# Patient Record
Sex: FEMALE | Race: WHITE | NOT HISPANIC OR LATINO | Employment: OTHER | URBAN - METROPOLITAN AREA
[De-identification: names, ages, dates, MRNs, and addresses within clinical notes are randomized per-mention and may not be internally consistent; named-entity substitution may affect disease eponyms.]

---

## 2019-06-27 ENCOUNTER — TELEPHONE (OUTPATIENT)
Dept: PULMONOLOGY | Facility: CLINIC | Age: 74
End: 2019-06-27

## 2019-06-27 DIAGNOSIS — R06.02 SOB (SHORTNESS OF BREATH): Primary | ICD-10-CM

## 2019-07-01 ENCOUNTER — CLINICAL SUPPORT (OUTPATIENT)
Dept: PULMONOLOGY | Facility: CLINIC | Age: 74
End: 2019-07-01
Payer: MEDICARE

## 2019-07-01 ENCOUNTER — HOSPITAL ENCOUNTER (OUTPATIENT)
Dept: RADIOLOGY | Facility: HOSPITAL | Age: 74
Discharge: HOME | End: 2019-07-01
Attending: INTERNAL MEDICINE
Payer: MEDICARE

## 2019-07-01 ENCOUNTER — OFFICE VISIT (OUTPATIENT)
Dept: PULMONOLOGY | Facility: CLINIC | Age: 74
End: 2019-07-01
Payer: MEDICARE

## 2019-07-01 VITALS
OXYGEN SATURATION: 95 % | RESPIRATION RATE: 18 BRPM | SYSTOLIC BLOOD PRESSURE: 140 MMHG | BODY MASS INDEX: 47.8 KG/M2 | HEART RATE: 61 BPM | DIASTOLIC BLOOD PRESSURE: 76 MMHG | HEIGHT: 63 IN | WEIGHT: 269.81 LBS

## 2019-07-01 DIAGNOSIS — R79.89 POSITIVE D DIMER: ICD-10-CM

## 2019-07-01 DIAGNOSIS — R06.00 DYSPNEA AND RESPIRATORY ABNORMALITIES: Primary | ICD-10-CM

## 2019-07-01 DIAGNOSIS — R06.02 SOB (SHORTNESS OF BREATH): ICD-10-CM

## 2019-07-01 DIAGNOSIS — Z12.9 SCREENING FOR CANCER: ICD-10-CM

## 2019-07-01 DIAGNOSIS — G47.33 OSA ON CPAP: ICD-10-CM

## 2019-07-01 DIAGNOSIS — R06.89 DYSPNEA AND RESPIRATORY ABNORMALITIES: Primary | ICD-10-CM

## 2019-07-01 DIAGNOSIS — R60.0 EDEMA OF LEG: ICD-10-CM

## 2019-07-01 DIAGNOSIS — R94.2 RESTRICTIVE VENTILATORY DEFECT: ICD-10-CM

## 2019-07-01 DIAGNOSIS — I10 ESSENTIAL HYPERTENSION: ICD-10-CM

## 2019-07-01 DIAGNOSIS — E78.5 HYPERLIPIDEMIA, UNSPECIFIED HYPERLIPIDEMIA TYPE: ICD-10-CM

## 2019-07-01 DIAGNOSIS — G47.52 CONTROLLED REM SLEEP BEHAVIOR DISORDER: ICD-10-CM

## 2019-07-01 DIAGNOSIS — Z87.891 PERSONAL HISTORY OF NICOTINE DEPENDENCE: ICD-10-CM

## 2019-07-01 DIAGNOSIS — J42 CHRONIC WHEEZY BRONCHITIS: ICD-10-CM

## 2019-07-01 PROBLEM — J44.9 CHRONIC OBSTRUCTIVE PULMONARY DISEASE: Status: ACTIVE | Noted: 2019-03-08

## 2019-07-01 PROBLEM — E66.01 MORBID OBESITY: Status: ACTIVE | Noted: 2018-05-04

## 2019-07-01 LAB
BRPFT: ABNORMAL
FEF 25 75 LLN: 0.77
FEF 25 75 PRE REF: 114.5 %
FEF 25 75 REF: 1.72
FEV1 FVC LLN: 64
FEV1 FVC PRE REF: 107.5 %
FEV1 FVC REF: 78
FEV1 LLN: 1.46
FEV1 PRE REF: 71.3 %
FEV1 REF: 2.03
FVC LLN: 1.89
FVC PRE REF: 65.8 %
FVC REF: 2.62
PEF LLN: 3.64
PEF PRE REF: 89.7 %
PEF REF: 5.28
PRE FEF 25 75: 1.97 L/S (ref 0.77–2.66)
PRE FET 100: 7.14 SEC
PRE FEV1 FVC: 83.83 % (ref 64.26–91.67)
PRE FEV1: 1.45 L (ref 1.46–2.59)
PRE FVC: 1.73 L (ref 1.89–3.35)
PRE PEF: 4.74 L/S (ref 3.64–6.92)

## 2019-07-01 PROCEDURE — 94375 PR RESPIRATORY FLOW VOLUME LOOP: ICD-10-PCS | Mod: S$GLB,,, | Performed by: INTERNAL MEDICINE

## 2019-07-01 PROCEDURE — 94375 RESPIRATORY FLOW VOLUME LOOP: CPT | Mod: S$GLB,,, | Performed by: INTERNAL MEDICINE

## 2019-07-01 PROCEDURE — 71046 X-RAY EXAM CHEST 2 VIEWS: CPT | Mod: 26,,, | Performed by: RADIOLOGY

## 2019-07-01 PROCEDURE — 71046 XR CHEST PA AND LATERAL: ICD-10-PCS | Mod: 26,,, | Performed by: RADIOLOGY

## 2019-07-01 PROCEDURE — 1101F PR PT FALLS ASSESS DOC 0-1 FALLS W/OUT INJ PAST YR: ICD-10-PCS | Mod: CPTII,S$GLB,, | Performed by: INTERNAL MEDICINE

## 2019-07-01 PROCEDURE — 99205 OFFICE O/P NEW HI 60 MIN: CPT | Mod: 25,S$GLB,, | Performed by: INTERNAL MEDICINE

## 2019-07-01 PROCEDURE — 99999 PR PBB SHADOW E&M-EST. PATIENT-LVL IV: ICD-10-PCS | Mod: PBBFAC,,, | Performed by: INTERNAL MEDICINE

## 2019-07-01 PROCEDURE — 99205 PR OFFICE/OUTPT VISIT, NEW, LEVL V, 60-74 MIN: ICD-10-PCS | Mod: 25,S$GLB,, | Performed by: INTERNAL MEDICINE

## 2019-07-01 PROCEDURE — 99999 PR PBB SHADOW E&M-EST. PATIENT-LVL IV: CPT | Mod: PBBFAC,,, | Performed by: INTERNAL MEDICINE

## 2019-07-01 PROCEDURE — 71046 X-RAY EXAM CHEST 2 VIEWS: CPT | Mod: TC

## 2019-07-01 PROCEDURE — 1101F PT FALLS ASSESS-DOCD LE1/YR: CPT | Mod: CPTII,S$GLB,, | Performed by: INTERNAL MEDICINE

## 2019-07-01 RX ORDER — ATORVASTATIN CALCIUM 40 MG/1
40 TABLET, FILM COATED ORAL
COMMUNITY
Start: 2019-03-08

## 2019-07-01 RX ORDER — ASPIRIN 81 MG/1
81 TABLET ORAL DAILY
COMMUNITY

## 2019-07-01 RX ORDER — ALLOPURINOL 100 MG/1
100 TABLET ORAL
COMMUNITY
Start: 2019-03-08

## 2019-07-01 RX ORDER — TALC
3 POWDER (GRAM) TOPICAL NIGHTLY
Qty: 90 TABLET | Refills: 3 | Status: SHIPPED | OUTPATIENT
Start: 2019-07-01 | End: 2020-06-30

## 2019-07-01 RX ORDER — BENAZEPRIL/HYDROCHLOROTHIAZIDE 20 MG-25MG
TABLET ORAL
COMMUNITY
Start: 2019-05-13

## 2019-07-01 NOTE — PROGRESS NOTES
Subjective:       Patient ID: Leanna Maddox is a 73 y.o. female.    Chief Complaint:  Leanna MADDOX 73 years old  She is also treated for other comorbidities including obstructive sleep apnea on CPAP, REM behavioral disorder, gout,  Also reviewed a low-dose screening CT scan that was performed May 3, 2019.  There were no obvious abnormalities noted on the low-dose screening CT scan.  Lung rads category 1.  Eligible for annual screening in 12 months  Referred to me by family member who is also patient of my  Concern for shortness of breath dyspnea wheezing with very minimal activity  Symptoms have been going on for quite a while.  She was prescribed by a short-acting bronchodilator which has not helped much.  She had attempted probably PFTs however was not able to meet ATS criteria.  Record seen in Care everywhere:  Patient has a significant smoking history half a pack a day for 52 years.    She quit successfully last year.  She still has shortness of breath on very minimal activity MRC score 1.  The wheezing also seems to walk up either with activity with rest  She denies any chest pain  She has had a cardiology exam we need to get a echo report  She has also had significant amount of 2+ lower extremity edema  She denies any childhood history of asthma.  No eosinophilia on CBC.    Spirometry today shows a restrictive defect she will need to come back for complete PFTs          The following portions of the patient's history were reviewed and updated as appropriate:   She  has no past medical history on file.  She does not have any pertinent problems on file.  She  has no past surgical history on file.  Her family history is not on file.  She  reports that she quit smoking about 12 months ago. Her smoking use included cigarettes. She has a 29.00 pack-year smoking history. She has never used smokeless tobacco. Her alcohol and drug histories are not on file.  She has a current medication list which includes the  "following prescription(s): allopurinol, aspirin, atorvastatin, benazepril-hydrochlorthiazide, and melatonin.  Current Outpatient Medications on File Prior to Visit   Medication Sig Dispense Refill    allopurinol (ZYLOPRIM) 100 MG tablet Take 100 mg by mouth.      aspirin (ECOTRIN) 81 MG EC tablet Take 81 mg by mouth once daily.      atorvastatin (LIPITOR) 40 MG tablet Take 40 mg by mouth.      benazepril-hydrochlorthiazide (LOTENSIN HCT) 20-25 mg Tab        No current facility-administered medications on file prior to visit.      She is allergic to codeine..      Review of Systems   Constitutional: Positive for malaise/fatigue.   HENT: Negative.    Eyes: Negative.    Respiratory: Positive for shortness of breath and wheezing.    Cardiovascular: Positive for leg swelling.   Gastrointestinal: Negative.    Genitourinary: Negative.    Musculoskeletal: Negative.    Skin: Negative.    Neurological: Negative.    Endo/Heme/Allergies: Negative.    Psychiatric/Behavioral: Negative.         Objective:        Vitals:    07/01/19 0921   BP: (!) 140/76   Pulse: 61   Resp: 18   SpO2: 95%   Weight: 122.4 kg (269 lb 12.8 oz)   Height: 5' 2.6" (1.59 m)       Physical Exam   Constitutional: She is oriented to person, place, and time. She appears well-developed and well-nourished.   HENT:   Head: Normocephalic and atraumatic.   Nose: Nose normal.   Mouth/Throat: Oropharynx is clear and moist. No oropharyngeal exudate.   Eyes: Pupils are equal, round, and reactive to light. Conjunctivae and EOM are normal.   Neck: Normal range of motion. Neck supple.   Cardiovascular: Normal rate and regular rhythm.   Murmur heard.  Pulmonary/Chest: Effort normal and breath sounds normal. No respiratory distress.   Abdominal: Soft. Bowel sounds are normal.   Musculoskeletal: Normal range of motion. She exhibits edema.   Neurological: She is alert and oriented to person, place, and time.   Skin: Skin is warm. Capillary refill takes 2 to 3 seconds. "   Nursing note and vitals reviewed.        Data Review: CBC: No results found for: WBC, RBC, HGB, HCT, PLT  BMP: No results found for: GLU, NA, K, CL, CO2, BUN, CREATININE, CALCIUM  Radiology review:  Low-dose screening CT scan report reviewed.  Asked patient to bring CD disc.  Diagnostics: Pulmonary function tests: FEV1: 1.45L  (71.3 % predicted), FVC:  1.73L (65.8 % predicted), FEV1/FVC:  84           Assessment:      Problem List Items Addressed This Visit     Essential hypertension     Stable followed up by PCP         Hyperlipidemia     Stable followed up by PCP         SONJA on CPAP     CPAP use needs validation         BMI 45.0-49.9, adult     General weight loss/lifestyle modification strategies discussed (elicit support from others; identify saboteurs; non-food rewards).  Diet interventions: low calorie (1000 kCal/d) deficit diet           Chronic wheezy bronchitis    Relevant Orders    Alpha 1 Antitrypsin Phenotype    Alpha-1-antitrypsin    Edema of leg     Needs to wear Compressions socking  Check BNP  Echocardiogram report         Relevant Orders    B-TYPE NATRIURETIC PEPTIDE    Restrictive ventilatory defect    Relevant Orders    Complete PFT without bronchodilator - Clinic    PULM - Arterial Blood Gases--in addition to PFT only    Controlled REM sleep behavior disorder     Not taking melatonin  In frequent nighttime events stable    Safe sleeping environment -- All patients with RBD and their bed partners should be counseled on ways to alter the sleeping environment to prevent injury. For patients with mild symptoms, this may be all that is needed.  Safety for both patients and bed partners is the paramount concern. Firearms should not be accessible, and sharp or easily breakable items (such as lamps) should be removed from the immediate sleeping area. In the event of continued vigorous behaviors, sleeping alone is advised. Many patients resort to using padded bed rails or sleeping in a sleeping bag  [79].  Other novel strategies are in development. Exiting the bed while acting out a dream is a high-risk behavior that may result in traumatic injury [83]. A bed alarm that delivers a customized calming message at the onset of dream enactment can prevent a patient from exiting the bed and avert sleep-related injury [84].             Relevant Medications    melatonin 3 mg Tab      Other Visit Diagnoses     Dyspnea and respiratory abnormalities    -  Primary    Relevant Orders    D dimer, quantitative (Completed)    Complete PFT without bronchodilator - Clinic    PULM - Arterial Blood Gases--in addition to PFT only    Stress test, pulmonary    Screening for cancer        Relevant Orders    CT Chest Lung Screening Low Dose    Personal history of nicotine dependence         Relevant Orders    CT Chest Lung Screening Low Dose         Plan:          Is still unclear of what the  for her shortness of breath these whether it is pulmonary vascular versus obstructive airway disease.  Spirometry today does not demonstrate any obstruction.  Based on review of PFT may consider giving her a trial of nebulizer    Follow up in about 6 weeks (around 8/12/2019), or get CD disc, Labs today, get echo report Dr Bryan: sign release, for PFT, 6MWD test, ABG on RA,  Low dose CT in 12 months.    This note was prepared using voice recognition system and is likely to have sound alike errors that may have been overlooked even after proof reading.  Please call me with any questions    Discussed diagnosis, its evaluation, treatment and usual course. All questions answered.    Thank you for the courtesy of participating in the care of this patient    Uday Inman MD    Component      Latest Ref Rng & Units 7/1/2019   D-Dimer      <0.50 mg/L FEU 0.63 (H)     Will need CT chest PE protocol

## 2019-07-01 NOTE — ASSESSMENT & PLAN NOTE
Not taking melatonin  In frequent nighttime events stable    Safe sleeping environment -- All patients with RBD and their bed partners should be counseled on ways to alter the sleeping environment to prevent injury. For patients with mild symptoms, this may be all that is needed.  Safety for both patients and bed partners is the paramount concern. Firearms should not be accessible, and sharp or easily breakable items (such as lamps) should be removed from the immediate sleeping area. In the event of continued vigorous behaviors, sleeping alone is advised. Many patients resort to using padded bed rails or sleeping in a sleeping bag [79].  Other novel strategies are in development. Exiting the bed while acting out a dream is a high-risk behavior that may result in traumatic injury [83]. A bed alarm that delivers a customized calming message at the onset of dream enactment can prevent a patient from exiting the bed and avert sleep-related injury [84].

## 2019-07-05 ENCOUNTER — TELEPHONE (OUTPATIENT)
Dept: PULMONOLOGY | Facility: CLINIC | Age: 74
End: 2019-07-05

## 2019-07-05 DIAGNOSIS — R94.2 RESTRICTIVE VENTILATORY DEFECT: Primary | ICD-10-CM

## 2019-07-05 NOTE — TELEPHONE ENCOUNTER
----- Message from Oanh Rachel sent at 7/5/2019  1:26 PM CDT -----  Contact: pt   Type:  Patient Returning Call    Who Called:Leanna Jefferson   Who Left Message for Patient:unsure  Does the patient know what this is regarding?:unsure  Would the patient rather a call back or a response via [facility]? call  Best Call Back Number:[phone]  Additional Information: n/a

## 2019-07-06 ENCOUNTER — HOSPITAL ENCOUNTER (OUTPATIENT)
Dept: RADIOLOGY | Facility: HOSPITAL | Age: 74
Discharge: HOME | End: 2019-07-06
Attending: INTERNAL MEDICINE
Payer: MEDICARE

## 2019-07-06 DIAGNOSIS — R06.89 DYSPNEA AND RESPIRATORY ABNORMALITIES: ICD-10-CM

## 2019-07-06 DIAGNOSIS — R06.00 DYSPNEA AND RESPIRATORY ABNORMALITIES: ICD-10-CM

## 2019-07-06 DIAGNOSIS — R79.89 POSITIVE D DIMER: ICD-10-CM

## 2019-07-06 LAB
CREAT SERPL-MCNC: 0.9 MG/DL (ref 0.5–1.4)
EST. GFR  (AFRICAN AMERICAN): >60 ML/MIN/1.73 M^2
EST. GFR  (NON AFRICAN AMERICAN): >60 ML/MIN/1.73 M^2

## 2019-07-06 PROCEDURE — 71275 CT ANGIOGRAPHY CHEST: CPT | Mod: TC

## 2019-07-06 PROCEDURE — 82565 ASSAY OF CREATININE: CPT

## 2019-07-06 PROCEDURE — 25500020 PHARM REV CODE 255: Performed by: INTERNAL MEDICINE

## 2019-07-06 RX ADMIN — IOHEXOL 100 ML: 350 INJECTION, SOLUTION INTRAVENOUS at 10:07

## 2019-07-31 ENCOUNTER — HOSPITAL ENCOUNTER (EMERGENCY)
Facility: HOSPITAL | Age: 74
Discharge: HOME | End: 2019-07-31
Attending: EMERGENCY MEDICINE
Payer: MEDICARE

## 2019-07-31 VITALS
SYSTOLIC BLOOD PRESSURE: 134 MMHG | DIASTOLIC BLOOD PRESSURE: 62 MMHG | RESPIRATION RATE: 18 BRPM | TEMPERATURE: 98 F | BODY MASS INDEX: 46.95 KG/M2 | HEART RATE: 88 BPM | OXYGEN SATURATION: 97 % | WEIGHT: 265 LBS | HEIGHT: 63 IN

## 2019-07-31 DIAGNOSIS — M79.606 LEG PAIN: ICD-10-CM

## 2019-07-31 DIAGNOSIS — S80.11XA CONTUSION OF RIGHT LOWER LEG, INITIAL ENCOUNTER: Primary | ICD-10-CM

## 2019-07-31 DIAGNOSIS — L03.115 CELLULITIS OF LEG, RIGHT: ICD-10-CM

## 2019-07-31 DIAGNOSIS — M25.569 KNEE PAIN: ICD-10-CM

## 2019-07-31 DIAGNOSIS — M79.639 FOREARM PAIN: ICD-10-CM

## 2019-07-31 DIAGNOSIS — M25.579 ANKLE PAIN: ICD-10-CM

## 2019-07-31 DIAGNOSIS — S40.021A ARM CONTUSION, RIGHT, INITIAL ENCOUNTER: ICD-10-CM

## 2019-07-31 PROCEDURE — 25000003 PHARM REV CODE 250: Performed by: NURSE PRACTITIONER

## 2019-07-31 PROCEDURE — 99284 EMERGENCY DEPT VISIT MOD MDM: CPT

## 2019-07-31 RX ORDER — TRAMADOL HYDROCHLORIDE 50 MG/1
50 TABLET ORAL EVERY 6 HOURS PRN
Qty: 12 TABLET | Refills: 0 | Status: SHIPPED | OUTPATIENT
Start: 2019-07-31 | End: 2019-08-12

## 2019-07-31 RX ORDER — HYDROCODONE BITARTRATE AND ACETAMINOPHEN 5; 325 MG/1; MG/1
1 TABLET ORAL
Status: COMPLETED | OUTPATIENT
Start: 2019-07-31 | End: 2019-07-31

## 2019-07-31 RX ORDER — SULFAMETHOXAZOLE AND TRIMETHOPRIM 800; 160 MG/1; MG/1
1 TABLET ORAL 2 TIMES DAILY
Qty: 14 TABLET | Refills: 0 | Status: SHIPPED | OUTPATIENT
Start: 2019-07-31 | End: 2019-08-07

## 2019-07-31 RX ADMIN — HYDROCODONE BITARTRATE AND ACETAMINOPHEN 1 TABLET: 5; 325 TABLET ORAL at 02:07

## 2019-07-31 NOTE — ED NOTES
Pt examined by Amando HOANG  without RN, educated on prescriptions, given discharge instructions and discharged to [facility]. See provider notes for exam.

## 2019-07-31 NOTE — ED PROVIDER NOTES
Encounter Date: 2019       History     Chief Complaint   Patient presents with    Leg Pain     patient states she tripped and fell on Friday, denies LOC, c/o R leg pain/bruising/swelling and R arm pain      Pt is a 75 y/o WM that presents complaining of leg pain. She reports that she fell due to uneven concrete 5 days ago. She landed on her right side with her R leg and R forearm absorbing most of the impact. She was able to ambulate after the event. The pain has been worsening and she has also noticed that her R leg has been swelling. It is painful to bear weight and ambulate now. She denies fevers, chest pain, dyspnea, syncope.        Review of patient's allergies indicates:   Allergen Reactions    Codeine Itching     Past Medical History:   Diagnosis Date    CHF (congestive heart failure)     Gout     Hypertension      No past surgical history on file.  No family history on file.  Social History     Tobacco Use    Smoking status: Former Smoker     Packs/day: 0.50     Years: 58.00     Pack years: 29.00     Types: Cigarettes     Last attempt to quit: 2018     Years since quittin.1    Smokeless tobacco: Never Used   Substance Use Topics    Alcohol use: Never     Frequency: Never    Drug use: Not on file     Review of Systems   Constitutional: Negative for fever.   HENT: Negative for sore throat.    Respiratory: Negative for shortness of breath.    Cardiovascular: Negative for chest pain.   Gastrointestinal: Negative for nausea and vomiting.   Genitourinary: Negative for dysuria.   Musculoskeletal: Positive for arthralgias, gait problem and myalgias. Negative for back pain, neck pain and neck stiffness.   Skin: Negative for rash.   Neurological: Negative for weakness.   Hematological: Does not bruise/bleed easily.       Physical Exam     Initial Vitals [19 1341]   BP Pulse Resp Temp SpO2   134/62 88 18 98 °F (36.7 °C) 97 %      MAP       --         Physical Exam    Nursing note and vitals  reviewed.  Constitutional: She appears well-developed and well-nourished.   HENT:   Head: Normocephalic and atraumatic.   Eyes: Conjunctivae and EOM are normal. Pupils are equal, round, and reactive to light.   Neck: Normal range of motion. Neck supple.   Cardiovascular: Normal rate, regular rhythm and intact distal pulses.   Pulmonary/Chest: Breath sounds normal.   Abdominal: Soft. There is no tenderness. There is no rebound and no guarding.   Musculoskeletal: Normal range of motion. She exhibits tenderness.   There is tenderness along the anterior R leg from ankle to knee with edema, ecchymosis, and erythema.  The right forearm has swelling and ecchymosis on the dorsal aspect.   Neurological: She is alert and oriented to person, place, and time. She has normal strength and normal reflexes.   Skin: Skin is warm and dry. There is erythema.   Psychiatric: She has a normal mood and affect. Her behavior is normal. Thought content normal.         ED Course   Procedures  Labs Reviewed - No data to display       Imaging Results          X-Ray Forearm Right (Final result)  Result time 07/31/19 14:49:56    Final result by August Brown MD (07/31/19 14:49:56)                 Impression:      No acute fracture or dislocation.      Electronically signed by: August Brown MD  Date:    07/31/2019  Time:    14:49             Narrative:    EXAMINATION:  XR FOREARM RIGHT    CLINICAL HISTORY:  XR FOREARM RIGHTPain in unspecified forearm    COMPARISON:  None    FINDINGS:  Two views of the right forearm were obtained.    No evidence of acute fracture or dislocation.  Bony mineralization is normal.  Soft tissues are unremarkable.                               X-Ray Ankle Complete Right (Final result)  Result time 07/31/19 14:51:10    Final result by August Brown MD (07/31/19 14:51:10)                 Impression:      No acute fracture or dislocation.    Moderate soft tissue swelling.      Electronically signed by: August Brown  MD  Date:    07/31/2019  Time:    14:51             Narrative:    EXAMINATION:  XR ANKLE COMPLETE 3 VIEW RIGHT    CLINICAL HISTORY:  XR ANKLE COMPLETE 3 VIEW RIGHTPain in unspecified ankle and joints of unspecified foot    COMPARISON:  None    FINDINGS:  Three views of the right ankle were obtained.    No evidence of acute fracture or dislocation.  Bony mineralization is normal.  Moderate soft tissue swelling.    Mild enthesopathy at the Achilles tendon insertion.                               X-Ray Knee 3 View Right (Final result)  Result time 07/31/19 14:48:04    Final result by August Brown MD (07/31/19 14:48:04)                 Impression:      No acute fracture or dislocation.      Electronically signed by: August Brown MD  Date:    07/31/2019  Time:    14:48             Narrative:    EXAMINATION:  XR KNEE 3 VIEW RIGHT    CLINICAL HISTORY:  Pain in unspecified knee    COMPARISON:  None    FINDINGS:  Results:  No evidence of acute fracture or dislocation.  Bony mineralization is normal.  Soft tissues are unremarkable. Lateral view of the right knee demonstrates no significant joint effusion.    No significant degenerative changes noted.                               X-Ray Tibia Fibula 2 View Right (Final result)  Result time 07/31/19 14:47:44    Final result by August Brown MD (07/31/19 14:47:44)                 Impression:      No acute fracture or dislocation.      Electronically signed by: August Brown MD  Date:    07/31/2019  Time:    14:47             Narrative:    EXAMINATION:  XR TIBIA FIBULA 2 VIEW RIGHT    CLINICAL HISTORY:  XR TIBIA FIBULA 2 VIEW RIGHTPain in leg, unspecified    COMPARISON:  None    FINDINGS:  Two views of the right tibia/fibula were obtained.    No evidence of acute fracture or dislocation.  Bony mineralization is normal.  Soft tissues are unremarkable.                                     Imaging Results          X-Ray Forearm Right (Final result)  Result time 07/31/19  14:49:56    Final result by August Brown MD (07/31/19 14:49:56)                 Impression:      No acute fracture or dislocation.      Electronically signed by: August Brown MD  Date:    07/31/2019  Time:    14:49             Narrative:    EXAMINATION:  XR FOREARM RIGHT    CLINICAL HISTORY:  XR FOREARM RIGHTPain in unspecified forearm    COMPARISON:  None    FINDINGS:  Two views of the right forearm were obtained.    No evidence of acute fracture or dislocation.  Bony mineralization is normal.  Soft tissues are unremarkable.                               X-Ray Ankle Complete Right (Final result)  Result time 07/31/19 14:51:10    Final result by August Brown MD (07/31/19 14:51:10)                 Impression:      No acute fracture or dislocation.    Moderate soft tissue swelling.      Electronically signed by: August Brown MD  Date:    07/31/2019  Time:    14:51             Narrative:    EXAMINATION:  XR ANKLE COMPLETE 3 VIEW RIGHT    CLINICAL HISTORY:  XR ANKLE COMPLETE 3 VIEW RIGHTPain in unspecified ankle and joints of unspecified foot    COMPARISON:  None    FINDINGS:  Three views of the right ankle were obtained.    No evidence of acute fracture or dislocation.  Bony mineralization is normal.  Moderate soft tissue swelling.    Mild enthesopathy at the Achilles tendon insertion.                               X-Ray Knee 3 View Right (Final result)  Result time 07/31/19 14:48:04    Final result by August Brown MD (07/31/19 14:48:04)                 Impression:      No acute fracture or dislocation.      Electronically signed by: August Brown MD  Date:    07/31/2019  Time:    14:48             Narrative:    EXAMINATION:  XR KNEE 3 VIEW RIGHT    CLINICAL HISTORY:  Pain in unspecified knee    COMPARISON:  None    FINDINGS:  Results:  No evidence of acute fracture or dislocation.  Bony mineralization is normal.  Soft tissues are unremarkable. Lateral view of the right knee demonstrates no significant  joint effusion.    No significant degenerative changes noted.                               X-Ray Tibia Fibula 2 View Right (Final result)  Result time 07/31/19 14:47:44    Final result by August Brown MD (07/31/19 14:47:44)                 Impression:      No acute fracture or dislocation.      Electronically signed by: August Brown MD  Date:    07/31/2019  Time:    14:47             Narrative:    EXAMINATION:  XR TIBIA FIBULA 2 VIEW RIGHT    CLINICAL HISTORY:  XR TIBIA FIBULA 2 VIEW RIGHTPain in leg, unspecified    COMPARISON:  None    FINDINGS:  Two views of the right tibia/fibula were obtained.    No evidence of acute fracture or dislocation.  Bony mineralization is normal.  Soft tissues are unremarkable.                                 2:59 PM  No calf tenderness to suggest DVT, no evidence of compartment syndrome, pt has mild to moderate erythema and warmth anterior tibia region, will cover for cellulitis, pt ambulatory with limp                 Clinical Impression:       ICD-10-CM ICD-9-CM   1. Contusion of right lower leg, initial encounter S80.11XA 924.10   2. Leg pain M79.606 729.5   3. Knee pain M25.569 719.46   4. Forearm pain M79.639 729.5   5. Ankle pain M25.579 719.47   6. Arm contusion, right, initial encounter S40.021A 923.9   7. Cellulitis of leg, right L03.115 682.6                                Amando Dennison NP  07/31/19 1503

## 2019-08-01 ENCOUNTER — TELEPHONE (OUTPATIENT)
Dept: PULMONOLOGY | Facility: CLINIC | Age: 74
End: 2019-08-01

## 2019-08-01 NOTE — TELEPHONE ENCOUNTER
----- Message from Cece Hayes sent at 8/1/2019 12:25 PM CDT -----  Contact: [phone]  Would like to consult with nurse regarding a follow-up from the hospital. Please call back at [phone]. Thanks/ar

## 2019-08-12 ENCOUNTER — CLINICAL SUPPORT (OUTPATIENT)
Dept: PULMONOLOGY | Facility: CLINIC | Age: 74
End: 2019-08-12
Payer: MEDICARE

## 2019-08-12 VITALS — HEIGHT: 62 IN | WEIGHT: 268.75 LBS | BODY MASS INDEX: 49.45 KG/M2

## 2019-08-12 DIAGNOSIS — R06.00 DYSPNEA AND RESPIRATORY ABNORMALITIES: ICD-10-CM

## 2019-08-12 DIAGNOSIS — R94.2 RESTRICTIVE VENTILATORY DEFECT: ICD-10-CM

## 2019-08-12 DIAGNOSIS — R06.89 DYSPNEA AND RESPIRATORY ABNORMALITIES: ICD-10-CM

## 2019-08-12 DIAGNOSIS — R06.02 SOB (SHORTNESS OF BREATH): ICD-10-CM

## 2019-08-12 DIAGNOSIS — R09.02 EXERCISE HYPOXEMIA: ICD-10-CM

## 2019-08-12 DIAGNOSIS — J42 CHRONIC WHEEZY BRONCHITIS: Primary | ICD-10-CM

## 2019-08-12 LAB
ALLENS TEST: ABNORMAL
DELSYS: ABNORMAL
FIO2: 21
HCO3 UR-SCNC: 26.7 MMOL/L (ref 24–28)
MODE: ABNORMAL
PCO2 BLDA: 35.9 MMHG (ref 35–45)
PH SMN: 7.48 [PH] (ref 7.35–7.45)
PO2 BLDA: 96 MMHG (ref 80–100)
POC BE: 3 MMOL/L
POC SATURATED O2: 98 % (ref 95–100)
SAMPLE: ABNORMAL
SITE: ABNORMAL

## 2019-08-12 PROCEDURE — 36600 PR WITHDRAWAL OF ARTERIAL BLOOD: ICD-10-PCS | Mod: S$GLB,,, | Performed by: INTERNAL MEDICINE

## 2019-08-12 PROCEDURE — 82803 PR  BLOOD GASES: PH, PO2 & PCO2: ICD-10-PCS | Mod: S$GLB,,, | Performed by: INTERNAL MEDICINE

## 2019-08-12 PROCEDURE — 94618 PULMONARY STRESS TESTING: ICD-10-PCS | Mod: S$GLB,,, | Performed by: INTERNAL MEDICINE

## 2019-08-12 PROCEDURE — 94618 PULMONARY STRESS TESTING: CPT | Mod: S$GLB,,, | Performed by: INTERNAL MEDICINE

## 2019-08-12 PROCEDURE — 82803 BLOOD GASES ANY COMBINATION: CPT | Mod: S$GLB,,, | Performed by: INTERNAL MEDICINE

## 2019-08-12 PROCEDURE — 36600 WITHDRAWAL OF ARTERIAL BLOOD: CPT | Mod: S$GLB,,, | Performed by: INTERNAL MEDICINE

## 2019-08-12 NOTE — PROCEDURES
"[facility]  Six Minute Walk     SUMMARY     Ordering Provider: Dr. Inman   Interpreting Provider: Dr. Inman  Performing nurse/tech/RT: Radha  Diagnosis: (Dyspnea and respiratory abnormalities)  Height: 5' 2" (157.5 cm)  Weight: 121.9 kg (268 lb 11.9 oz)  BMI (Calculated): 49.3   Patient Race:             Phase Oxygen Assessment Supplemental O2 Heart   Rate Blood Pressure Charleen Dyspnea Scale Rating   Resting 96 % Room Air 63 bpm 163/73 2   Exercise        Minute        1 84 % Room Air(placed on 2lpm) 88 bpm     2 93 % 2 L/M 88 bpm     3 93 % 2 L/M 97 bpm     4 93 % 2 L/M 96 bpm     5 100 % 2 L/M 97 bpm     6  97 % 2 L/M 101 bpm 170/81 5-6   Recovery        Minute        1 98 % 2 L/M 83 bpm     2 98 % 2 L/M 74 bpm     3 97 % 2.5 L/M 70 bpm     4 97 % 2 L/M 70 bpm 157/74 2     Six Minute Walk Summary  6MWT Status: completed without stopping  Patient Reported: Dyspnea, Leg pain, Other (Comment)(back pain( lower))     Interpretation:  Did the patient stop or pause?: No         Total Time Walked (Calculated): 360 seconds  Final Partial Lap Distance (feet): 0 feet  Total Distance Meters (Calculated): 304.8 meters  Predicted Distance Meters (Calculated): 298.23 meters   LLN was 159.23 m  Percentage of Predicted (Calculated): 102.2  Peak VO2 (Calculated): 13.12  Mets: 3.75  Has The Patient Had a Previous Six Minute Walk Test?: No       Previous 6MWT Results  Has The Patient Had a Previous Six Minute Walk Test?: No          CLINICAL INTERPRETATION:  Six minute walk distance is 304.8m (102.2 % predicted) with moderate dyspnea.  During exercise, there was significant desaturation while breathing room air.  SpO2 jose was 84 at 1st minute. Oxygen was added 2.0 LPM   Blood pressure increased significantly and Heart rate remained stable with walking.  Hypertension was present prior to exercise.  This may represent a hypertensive and an abnormal cardiovascular response to exercise.  The " patient reported non-pulmonary symptoms during exercise.  Back pain.  The patient did complete the study, walking 360 seconds of the 360 second test.  The patient may benefit from using supplemental oxygen during exertion.  No previous study performed.  Based upon age and body mass index, exercise capacity is normal.

## 2019-08-12 NOTE — PROGRESS NOTES
Audible wheezing noted. PFT not done today. Inhalers not taken today due to patient following orders for test.

## 2019-08-13 NOTE — PROGRESS NOTES
CLINICAL INTERPRETATION:  Six minute walk distance is 304.8m (102.2 % predicted) with   moderate dyspnea.  During exercise, there was significant desaturation while   breathing room air.  SpO2 jose was 84 at 1st minute. Oxygen was added 2.0 LPM   Blood pressure increased significantly and Heart rate remained   stable with walking.  Hypertension was present prior to exercise.  This may represent a hypertensive and an abnormal cardiovascular   response to exercise.  The patient reported non-pulmonary symptoms during exercise.  Back pain.  The patient did complete the study, walking 360 seconds of the   360 second test.  The patient may benefit from using supplemental oxygen during   exertion.  No previous study performed.  Based upon age and body mass index, exercise capacity is normal.

## 2019-08-16 ENCOUNTER — CLINICAL SUPPORT (OUTPATIENT)
Dept: PULMONOLOGY | Facility: CLINIC | Age: 74
End: 2019-08-16
Payer: MEDICARE

## 2019-08-16 DIAGNOSIS — R06.02 SOB (SHORTNESS OF BREATH): ICD-10-CM

## 2019-08-16 PROCEDURE — 94726 PULM FUNCT TST PLETHYSMOGRAP: ICD-10-PCS | Mod: S$GLB,,, | Performed by: INTERNAL MEDICINE

## 2019-08-16 PROCEDURE — 94060 EVALUATION OF WHEEZING: CPT | Mod: S$GLB,,, | Performed by: INTERNAL MEDICINE

## 2019-08-16 PROCEDURE — 94729 DIFFUSING CAPACITY: CPT | Mod: S$GLB,,, | Performed by: INTERNAL MEDICINE

## 2019-08-16 PROCEDURE — 94060 PR EVAL OF BRONCHOSPASM: ICD-10-PCS | Mod: S$GLB,,, | Performed by: INTERNAL MEDICINE

## 2019-08-16 PROCEDURE — 94726 PLETHYSMOGRAPHY LUNG VOLUMES: CPT | Mod: S$GLB,,, | Performed by: INTERNAL MEDICINE

## 2019-08-16 PROCEDURE — 94729 PR C02/MEMBANE DIFFUSE CAPACITY: ICD-10-PCS | Mod: S$GLB,,, | Performed by: INTERNAL MEDICINE

## 2019-08-17 LAB
BRPFT: ABNORMAL
DLCO ADJ PRE: 16.01 ML/(MIN*MMHG) (ref 13.75–25.22)
DLCO SINGLE BREATH LLN: 13.75
DLCO SINGLE BREATH PRE REF: 82.1 %
DLCO SINGLE BREATH REF: 19.49
DLCOC SBVA LLN: 2.7
DLCOC SBVA PRE REF: 132.9 %
DLCOC SBVA REF: 4.26
DLCOC SINGLE BREATH LLN: 13.75
DLCOC SINGLE BREATH PRE REF: 82.1 %
DLCOC SINGLE BREATH REF: 19.49
DLCOVA LLN: 2.7
DLCOVA PRE REF: 132.9 %
DLCOVA PRE: 5.66 ML/(MIN*MMHG*L) (ref 2.7–5.82)
DLCOVA REF: 4.26
DLVAADJ PRE: 5.66 ML/(MIN*MMHG*L) (ref 2.7–5.82)
ERV LLN: 0.58
ERV PRE REF: 17.2 %
ERV REF: 0.58
FEF 25 75 CHG: 32.6 %
FEF 25 75 LLN: 0.75
FEF 25 75 POST REF: 112.9 %
FEF 25 75 PRE REF: 85.2 %
FEF 25 75 REF: 1.68
FET100 CHG: -24.3 %
FEV1 CHG: 6.9 %
FEV1 FVC CHG: 7.6 %
FEV1 FVC LLN: 64
FEV1 FVC POST REF: 108 %
FEV1 FVC PRE REF: 100.4 %
FEV1 FVC REF: 78
FEV1 LLN: 1.42
FEV1 POST REF: 68.7 %
FEV1 PRE REF: 64.3 %
FEV1 REF: 1.97
FRCPLETH LLN: 1.77
FRCPLETH PREREF: 72.1 %
FRCPLETH REF: 2.59
FVC CHG: -0.7 %
FVC LLN: 1.83
FVC POST REF: 63.1 %
FVC PRE REF: 63.6 %
FVC REF: 2.54
IVC PRE: 1.63 L (ref 1.83–3.25)
IVC SINGLE BREATH LLN: 1.83
IVC SINGLE BREATH PRE REF: 63.9 %
IVC SINGLE BREATH REF: 2.54
MVV LLN: 60
MVV PRE REF: 90.1 %
MVV REF: 71
PEF CHG: -2.1 %
PEF LLN: 3.49
PEF POST REF: 62.9 %
PEF PRE REF: 64.2 %
PEF REF: 5.08
POST FEF 25 75: 1.9 L/S (ref 0.75–2.61)
POST FET 100: 7.54 SEC
POST FEV1 FVC: 84.31 % (ref 64.32–91.8)
POST FEV1: 1.35 L (ref 1.42–2.52)
POST FVC: 1.61 L (ref 1.83–3.25)
POST PEF: 3.19 L/S (ref 3.49–6.68)
PRE DLCO: 16.01 ML/(MIN*MMHG) (ref 13.75–25.22)
PRE ERV: 0.1 L (ref 0.58–0.58)
PRE FEF 25 75: 1.43 L/S (ref 0.75–2.61)
PRE FET 100: 9.95 SEC
PRE FEV1 FVC: 78.35 % (ref 64.32–91.8)
PRE FEV1: 1.27 L (ref 1.42–2.52)
PRE FRC PL: 1.87 L
PRE FVC: 1.62 L (ref 1.83–3.25)
PRE MVV: 64 L/MIN (ref 60.4–81.72)
PRE PEF: 3.26 L/S (ref 3.49–6.68)
PRE RV: 1.78 L (ref 1.43–2.59)
PRE TLC: 3.58 L (ref 3.58–5.56)
RAW LLN: 3.06
RAW PRE REF: 149.5 %
RAW PRE: 4.57 CMH2O*S/L (ref 3.06–3.06)
RAW REF: 3.06
RV LLN: 1.43
RV PRE REF: 88.4 %
RV REF: 2.01
RVTLC LLN: 34
RVTLC PRE REF: 113.5 %
RVTLC PRE: 49.68 % (ref 34.19–53.37)
RVTLC REF: 44
TLC LLN: 3.59
TLC PRE REF: 78.2 %
TLC REF: 4.57
VA PRE: 2.84 L (ref 4.42–4.42)
VA SINGLE BREATH LLN: 4.42
VA SINGLE BREATH PRE REF: 64.2 %
VA SINGLE BREATH REF: 4.42
VC LLN: 1.83
VC PRE REF: 70.8 %
VC PRE: 1.8 L (ref 1.83–3.25)
VC REF: 2.54
VTGRAWPRE: 2.43 L

## 2019-08-26 ENCOUNTER — OFFICE VISIT (OUTPATIENT)
Dept: PULMONOLOGY | Facility: CLINIC | Age: 74
End: 2019-08-26
Payer: MEDICARE

## 2019-08-26 VITALS
DIASTOLIC BLOOD PRESSURE: 72 MMHG | WEIGHT: 265.44 LBS | BODY MASS INDEX: 48.85 KG/M2 | RESPIRATION RATE: 18 BRPM | OXYGEN SATURATION: 97 % | SYSTOLIC BLOOD PRESSURE: 134 MMHG | HEART RATE: 73 BPM | HEIGHT: 62 IN

## 2019-08-26 DIAGNOSIS — Z12.2 SCREENING FOR RESPIRATORY ORGAN CANCER: ICD-10-CM

## 2019-08-26 DIAGNOSIS — E78.5 HYPERLIPIDEMIA, UNSPECIFIED HYPERLIPIDEMIA TYPE: ICD-10-CM

## 2019-08-26 DIAGNOSIS — G47.52 CONTROLLED REM SLEEP BEHAVIOR DISORDER: ICD-10-CM

## 2019-08-26 DIAGNOSIS — I10 ESSENTIAL HYPERTENSION: ICD-10-CM

## 2019-08-26 DIAGNOSIS — R09.02 EXERCISE HYPOXEMIA: ICD-10-CM

## 2019-08-26 DIAGNOSIS — R60.0 EDEMA OF LEG: ICD-10-CM

## 2019-08-26 DIAGNOSIS — R94.2 RESTRICTIVE VENTILATORY DEFECT: ICD-10-CM

## 2019-08-26 DIAGNOSIS — G47.33 OSA ON CPAP: ICD-10-CM

## 2019-08-26 DIAGNOSIS — J42 CHRONIC WHEEZY BRONCHITIS: Primary | ICD-10-CM

## 2019-08-26 PROBLEM — M19.90 ARTHRITIS: Status: ACTIVE | Noted: 2019-08-26

## 2019-08-26 PROBLEM — I65.23 BILATERAL CAROTID ARTERY STENOSIS: Status: ACTIVE | Noted: 2019-03-08

## 2019-08-26 PROCEDURE — 99214 OFFICE O/P EST MOD 30 MIN: CPT | Mod: S$GLB,,, | Performed by: INTERNAL MEDICINE

## 2019-08-26 PROCEDURE — 1100F PTFALLS ASSESS-DOCD GE2>/YR: CPT | Mod: CPTII,S$GLB,, | Performed by: INTERNAL MEDICINE

## 2019-08-26 PROCEDURE — 3288F FALL RISK ASSESSMENT DOCD: CPT | Mod: CPTII,S$GLB,, | Performed by: INTERNAL MEDICINE

## 2019-08-26 PROCEDURE — 99999 PR PBB SHADOW E&M-EST. PATIENT-LVL III: CPT | Mod: PBBFAC,,, | Performed by: INTERNAL MEDICINE

## 2019-08-26 PROCEDURE — 1100F PR PT FALLS ASSESS DOC 2+ FALLS/FALL W/INJURY/YR: ICD-10-PCS | Mod: CPTII,S$GLB,, | Performed by: INTERNAL MEDICINE

## 2019-08-26 PROCEDURE — 99999 PR PBB SHADOW E&M-EST. PATIENT-LVL III: ICD-10-PCS | Mod: PBBFAC,,, | Performed by: INTERNAL MEDICINE

## 2019-08-26 PROCEDURE — 99214 PR OFFICE/OUTPT VISIT, EST, LEVL IV, 30-39 MIN: ICD-10-PCS | Mod: S$GLB,,, | Performed by: INTERNAL MEDICINE

## 2019-08-26 PROCEDURE — 3288F PR FALLS RISK ASSESSMENT DOCUMENTED: ICD-10-PCS | Mod: CPTII,S$GLB,, | Performed by: INTERNAL MEDICINE

## 2019-08-26 RX ORDER — ALBUTEROL SULFATE 1.25 MG/3ML
1.25 SOLUTION RESPIRATORY (INHALATION) 3 TIMES DAILY PRN
Qty: 1 BOX | Refills: 2 | Status: SHIPPED | OUTPATIENT
Start: 2019-08-26 | End: 2019-10-17 | Stop reason: SDUPTHER

## 2019-08-26 RX ORDER — TRIAMCINOLONE ACETONIDE 1 MG/G
CREAM TOPICAL
COMMUNITY
Start: 2019-05-31

## 2019-08-26 RX ORDER — BUDESONIDE 0.5 MG/2ML
0.5 INHALANT ORAL 2 TIMES DAILY
Qty: 120 ML | Refills: 3 | Status: SHIPPED | OUTPATIENT
Start: 2019-08-26 | End: 2020-02-26

## 2019-08-26 NOTE — PATIENT INSTRUCTIONS
What Is Chronic Bronchitis?  Chronic bronchitis is when damaged lungs make more mucus than they should. If you cough up mucus and feel short of breath for at least three months each year, two or more years in a row, without another diagnosis to explain the cough, you may have chronic bronchitis.  Healthy lungs  This is what happens when your lungs are healthy:  · Inside the lungs are branching airways of stretchy tissue. Each airway is wrapped with bands of muscle that help keep it open. Air travels in and out of the lungs through these airways.  · The cells in the lining of the airways produce a sticky fluid called mucus. This traps dust, smoke, and other particles in the air you breathe and helps protect the lungs.  · Tiny hairs, called cilia, then sweep the mucus up the airways to the throat, where it is swallowed or coughed up, again to protect the lungs.         When you have chronic bronchitis  This is what happens when you have chronic bronchitis:  · Cells in the airways make more mucus than normal. The mucus builds up, narrowing the airways. This means less air travels into and out of the lungs.  · The lining of the airways may also become inflamed (swollen). And, the muscle surrounding the airways may constrict (tighten). These problems cause the airways to narrow even more.  · The cilia may also be damaged. This means they cant sweep mucus and particles away. This damage makes the problems described above even worse.         Date Last Reviewed: 5/1/2016  © 5170-7390 [employer]. [address] [place] [id number]. All rights reserved. This information is not intended as a substitute for professional medical care. Always follow your healthcare professional's instructions.        Step-by-Step  Using a Nebulizer     11 steps in using a nebulizer with a face mask     Date Last Reviewed: 3/1/2017  © 8667-8344 [employer]. [address] [place] [address]. All  rights reserved. This information is not intended as a substitute for professional medical care. Always follow your healthcare professional's instructions.

## 2019-08-26 NOTE — ASSESSMENT & PLAN NOTE
Adherence with melatonin  In frequent nighttime events stable    Safe sleeping environment -- All patients with RBD and their bed partners should be counseled on ways to alter the sleeping environment to prevent injury. For patients with mild symptoms, this may be all that is needed.  Safety for both patients and bed partners is the paramount concern. Firearms should not be accessible, and sharp or easily breakable items (such as lamps) should be removed from the immediate sleeping area. In the event of continued vigorous behaviors, sleeping alone is advised. Many patients resort to using padded bed rails or sleeping in a sleeping bag [79].  Other novel strategies are in development. Exiting the bed while acting out a dream is a high-risk behavior that may result in traumatic injury [83]. A bed alarm that delivers a customized calming message at the onset of dream enactment can prevent a patient from exiting the bed and avert sleep-related injury [84].

## 2019-08-26 NOTE — PROGRESS NOTES
Subjective:       Patient ID: Leanna Maddox is a 74 y.o. female.  Patient Active Problem List   Diagnosis    Essential hypertension    Hyperlipidemia    SONJA on CPAP    BMI 45.0-49.9, adult    Chronic wheezy bronchitis    Edema of leg    Restrictive ventilatory defect    Controlled REM sleep behavior disorder    Anxiety    Arthritis    Bilateral carotid artery stenosis    Idiopathic chronic gout of right foot without tophus    Exercise hypoxemia    Screening for respiratory organ cancer     Immunization History   Administered Date(s) Administered    Influenza - High Dose - PF (65 years and older) 2012, 2014, 2015, 2018    Influenza - Trivalent - PF (ADULT) 10/15/2013    Pneumococcal Conjugate - 13 Valent 2018    Pneumococcal Polysaccharide - 23 Valent 2011    Tdap 2018     Social History     Tobacco Use   Smoking Status Former Smoker    Packs/day: 0.50    Years: 58.00    Pack years: 29.00    Types: Cigarettes    Last attempt to quit: 2018    Years since quittin.2   Smokeless Tobacco Never Used     Answers for HPI/ROS submitted by the patient on 2019   Asthma  In the past 4 weeks, how much of the time did your asthma keep you from getting as much done at work, school, or at home?: some of the time  During the past 4 weeks, how often have you had shortness of breath?: once a day  During the past 4 weeks, how often did your asthma symptoms (Wheezing, coughing, shortness of breath, chest tightness or pain) wake you up at night or earlier that usual in the morning?: not at all  During the past 4 weeks, how often have you used your rescue inhaler or nebulizer medication (such as albuterol)?: 3 or more times per day  How would you rate your asthma control during the past 4 weeks?: poorly controlled   : 13    Chief Complaint:  Leanna MADDOX 73 years old  She is also treated for other comorbidities including obstructive sleep apnea on CPAP,  REM behavioral disorder, gout,  Restrictive defect and Chronic wheezy bronchitis  Here to review: PFT, 6MWD, ABG  Qualified for oxygen. Quit smoking last year.  Results reviewed with patient  Nebulizer ordered today  She was at her oxygen   Discussed lower extremity edema adherence to stockings  Also reviewed a low-dose screening CT scan that was performed May 3, 2019.  Annual follow-up has been entered      The following portions of the patient's history were reviewed and updated as appropriate:   She  has a past medical history of CHF (congestive heart failure), Gout, and Hypertension.  She does not have any pertinent problems on file.  She  has no past surgical history on file.  Her family history is not on file.  She  reports that she quit smoking about 14 months ago. Her smoking use included cigarettes. She has a 29.00 pack-year smoking history. She has never used smokeless tobacco. She reports that she does not drink alcohol. Her drug history is not on file.  She has a current medication list which includes the following prescription(s): allopurinol, aspirin, atorvastatin, benazepril-hydrochlorthiazide, melatonin, triamcinolone acetonide 0.1%, albuterol, and budesonide.  Current Outpatient Medications on File Prior to Visit   Medication Sig Dispense Refill    allopurinol (ZYLOPRIM) 100 MG tablet Take 100 mg by mouth.      aspirin (ECOTRIN) 81 MG EC tablet Take 81 mg by mouth once daily.      atorvastatin (LIPITOR) 40 MG tablet Take 40 mg by mouth.      benazepril-hydrochlorthiazide (LOTENSIN HCT) 20-25 mg Tab       melatonin 3 mg Tab Take 1 tablet (3 mg total) by mouth every evening. 90 tablet 3    triamcinolone acetonide 0.1% (KENALOG) 0.1 % cream APPLY TO AFFECTED AREA BID PRN       No current facility-administered medications on file prior to visit.      She is allergic to codeine..      Review of Systems   Constitutional: Positive for malaise/fatigue.   HENT: Negative.    Eyes: Negative.   "  Respiratory: Positive for shortness of breath. Negative for wheezing.    Cardiovascular: Positive for leg swelling.   Gastrointestinal: Negative.    Genitourinary: Negative.    Musculoskeletal: Negative.    Skin: Negative.    Neurological: Negative.    Endo/Heme/Allergies: Negative.    Psychiatric/Behavioral: Negative.         Objective:        Vitals:    08/26/19 1306   BP: 134/72   Pulse: 73   Resp: 18   SpO2: 97%   Weight: 120.4 kg (265 lb 6.9 oz)   Height: 5' 2" (1.575 m)       Physical Exam   Constitutional: She is oriented to person, place, and time. She appears well-developed and well-nourished.   HENT:   Head: Normocephalic and atraumatic.   Nose: Nose normal.   Mouth/Throat: Oropharynx is clear and moist. No oropharyngeal exudate.   Eyes: Pupils are equal, round, and reactive to light. Conjunctivae and EOM are normal.   Neck: Normal range of motion. Neck supple.   Cardiovascular: Normal rate and regular rhythm.   Murmur heard.  Pulmonary/Chest: Effort normal. No respiratory distress. She has wheezes.   Abdominal: Soft. Bowel sounds are normal.   Musculoskeletal: Normal range of motion. She exhibits edema.   Neurological: She is alert and oriented to person, place, and time.   Skin: Skin is warm. Capillary refill takes 2 to 3 seconds.   Nursing note and vitals reviewed.        Data Review: CBC: No results found for: WBC, RBC, HGB, HCT, PLT  BMP:   Lab Results   Component Value Date    CREATININE 0.9 07/06/2019     Radiology review:  Low-dose screening CT scan report reviewed.  Report scanned in epic    Diagnostics: Pulmonary function tests: FEV1: 1.27L  (64.3 % predicted), FVC:  1.62L (63.6 % predicted), FEV1/FVC:  78     TLC: 3.58L( 78.2%)  DLCO: 16.01( 82.1%  Flow volume loops demonstrate a restrictive defect.   Mild restriction. (TLC< LLN and > or equal to 70% of predicted).   Overall there is moderate ventilatory impairment. (FEV1 > or equal to 60% of predicted and < or equal to 69% predicted ). " "  Airflow is not clearly improved after bronchodilator. Clinical improvement following bronchodilator therapy may occur in the absence of spirometric improvement.   Maximum voluntary ventilation is normal. - (MVV % predicted is greater than or equal to LLN )   The diffusing capacity for carbon monoxide is normal (unadjusted for hemoglobin).     6MW Test  Height: 5' 2" (157.5 cm)  Weight: 120.4 kg (265 lb 6.9 oz)  BMI (Calculated): 48.6  Predicted Distance: 149.15  Interpretation  Predicted Distance Meters (Calculated): 301.67 meters  SpO2 jose was 84%  Oxygen was added 2.0 LPM  Distance: 304.8m( predicted: 102.2%)     Component      Latest Ref Rng & Units 8/12/2019   POC PH      7.35 - 7.45 7.480 (H)   POC PCO2      35 - 45 mmHg 35.9   POC PO2      80 - 100 mmHg 96   POC HCO3      24 - 28 mmol/L 26.7   POC BE      -2 to 2 mmol/L 3   POC SATURATED O2      95 - 100 % 98   Sample       ARTERIAL   Site       RR   Allens Test       Pass   DelSys       Room Air   Mode       SPONT   FiO2       21       Assessment:      Problem List Items Addressed This Visit     Essential hypertension     stable         Hyperlipidemia     stable         SONJA on CPAP    BMI 45.0-49.9, adult     General weight loss/lifestyle modification strategies discussed (elicit support from others; identify saboteurs; non-food rewards).  Diet interventions: low calorie (1000 kCal/d) deficit diet           Chronic wheezy bronchitis - Primary     ACT 13  FEV1: 1.27L ( 64.3%)  Added Pulmicort and Albuterol  Reassess in 8 weeks           Relevant Medications    albuterol (ACCUNEB) 1.25 mg/3 mL Nebu    budesonide (PULMICORT) 0.5 mg/2 mL nebulizer solution    Other Relevant Orders    NEBULIZER FOR HOME USE    Spirometry with/without bronchodilator    Edema of leg     Compression stockings  20-30 mmHg per Dr Bryan         Restrictive ventilatory defect     FVC : 1.62L ( 63.6%), TLC 3.58L( 78.2%)         Relevant Medications    albuterol (ACCUNEB) 1.25 mg/3 " "mL Nebu    budesonide (PULMICORT) 0.5 mg/2 mL nebulizer solution    Other Relevant Orders    NEBULIZER FOR HOME USE    Spirometry with/without bronchodilator    Controlled REM sleep behavior disorder    Exercise hypoxemia     6MWD  Desat to 84%  Oxygen added 2.0 LPM  BP was elevated 170/81         Relevant Orders    Spirometry with/without bronchodilator    Screening for respiratory organ cancer     Low dose Ct chest as previously ordered              Plan:          Based on review of PFT  giving her a trial of nebulizer    Will start on Neb  Requested Prescriptions     Signed Prescriptions Disp Refills    albuterol (ACCUNEB) 1.25 mg/3 mL Nebu 1 Box 2     Sig: Take 3 mLs (1.25 mg total) by nebulization 3 (three) times daily as needed. Rescue    budesonide (PULMICORT) 0.5 mg/2 mL nebulizer solution 120 mL 3     Sig: Take 2 mLs (0.5 mg total) by nebulization 2 (two) times daily. Controller     Orders Placed This Encounter   Procedures    NEBULIZER FOR HOME USE     Order Specific Question:   Height:     Answer:   5' 2" (1.575 m)     Order Specific Question:   Weight:     Answer:   120.4 kg (265 lb 6.9 oz)     Order Specific Question:   Length of need (1-99 months):     Answer:   99    Spirometry with/without bronchodilator     Standing Status:   Future     Standing Expiration Date:   8/25/2020         Follow up with [facility] [place]< Oxygen ordered. Nebulizer  ordered.    This note was prepared using voice recognition system and is likely to have sound alike errors that may have been overlooked even after proof reading.  Please call me with any questions    Discussed diagnosis, its evaluation, treatment and usual course. All questions answered.    Thank you for the courtesy of participating in the care of this patient    Uday Inman MD    "

## 2019-08-26 NOTE — LETTER
August 26, 2019        Charito Lugo MD  [address]  [facility] [address]             [facility]  [address] [facility] [address]  Phone: [phone]  Fax: [phone]   Patient: Leanna Jefferson   MR Number: [id number]   YOB: 1945   Date of Visit: 8/26/2019       Dear Dr. Lugo:    Thank you for referring Leanna Jefferson to me for evaluation. Attached you will find relevant portions of my assessment and plan of care.    If you have questions, please do not hesitate to call me. I look forward to following Leanna Jefferson along with you.    Sincerely,      Uday Inman MD            CC  No Recipients    Enclosure

## 2019-10-17 DIAGNOSIS — R94.2 RESTRICTIVE VENTILATORY DEFECT: ICD-10-CM

## 2019-10-17 DIAGNOSIS — J42 CHRONIC WHEEZY BRONCHITIS: ICD-10-CM

## 2019-10-17 RX ORDER — ALBUTEROL SULFATE 1.25 MG/3ML
SOLUTION RESPIRATORY (INHALATION)
Qty: 75 ML | Refills: 11 | Status: SHIPPED | OUTPATIENT
Start: 2019-10-17 | End: 2020-03-20 | Stop reason: SDUPTHER

## 2019-11-11 ENCOUNTER — OFFICE VISIT (OUTPATIENT)
Dept: PULMONOLOGY | Facility: CLINIC | Age: 74
End: 2019-11-11
Payer: MEDICARE

## 2019-11-11 ENCOUNTER — CLINICAL SUPPORT (OUTPATIENT)
Dept: PULMONOLOGY | Facility: CLINIC | Age: 74
End: 2019-11-11
Payer: MEDICARE

## 2019-11-11 VITALS
SYSTOLIC BLOOD PRESSURE: 126 MMHG | HEART RATE: 89 BPM | OXYGEN SATURATION: 98 % | BODY MASS INDEX: 49.78 KG/M2 | HEIGHT: 62 IN | WEIGHT: 270.5 LBS | DIASTOLIC BLOOD PRESSURE: 70 MMHG | RESPIRATION RATE: 18 BRPM

## 2019-11-11 DIAGNOSIS — G47.33 OSA ON CPAP: ICD-10-CM

## 2019-11-11 DIAGNOSIS — J42 CHRONIC WHEEZY BRONCHITIS: Primary | ICD-10-CM

## 2019-11-11 DIAGNOSIS — R09.02 EXERCISE HYPOXEMIA: ICD-10-CM

## 2019-11-11 DIAGNOSIS — I77.1 TORTUOUS AORTA: ICD-10-CM

## 2019-11-11 DIAGNOSIS — R94.2 RESTRICTIVE VENTILATORY DEFECT: ICD-10-CM

## 2019-11-11 DIAGNOSIS — G47.52 CONTROLLED REM SLEEP BEHAVIOR DISORDER: ICD-10-CM

## 2019-11-11 DIAGNOSIS — J42 CHRONIC WHEEZY BRONCHITIS: ICD-10-CM

## 2019-11-11 PROCEDURE — 99999 PR PBB SHADOW E&M-EST. PATIENT-LVL III: CPT | Mod: PBBFAC,,, | Performed by: NURSE PRACTITIONER

## 2019-11-11 PROCEDURE — 1101F PT FALLS ASSESS-DOCD LE1/YR: CPT | Mod: CPTII,S$GLB,, | Performed by: NURSE PRACTITIONER

## 2019-11-11 PROCEDURE — 1101F PR PT FALLS ASSESS DOC 0-1 FALLS W/OUT INJ PAST YR: ICD-10-PCS | Mod: CPTII,S$GLB,, | Performed by: NURSE PRACTITIONER

## 2019-11-11 PROCEDURE — 99999 PR PBB SHADOW E&M-EST. PATIENT-LVL III: ICD-10-PCS | Mod: PBBFAC,,, | Performed by: NURSE PRACTITIONER

## 2019-11-11 PROCEDURE — 94060 EVALUATION OF WHEEZING: CPT | Mod: S$GLB,,, | Performed by: INTERNAL MEDICINE

## 2019-11-11 PROCEDURE — 99214 PR OFFICE/OUTPT VISIT, EST, LEVL IV, 30-39 MIN: ICD-10-PCS | Mod: 25,S$GLB,, | Performed by: NURSE PRACTITIONER

## 2019-11-11 PROCEDURE — 94060 PR EVAL OF BRONCHOSPASM: ICD-10-PCS | Mod: S$GLB,,, | Performed by: INTERNAL MEDICINE

## 2019-11-11 PROCEDURE — 99214 OFFICE O/P EST MOD 30 MIN: CPT | Mod: 25,S$GLB,, | Performed by: NURSE PRACTITIONER

## 2019-11-11 NOTE — PROGRESS NOTES
Subjective:      Patient ID: Leanna Jefferson is a 74 y.o. female.    Patient Active Problem List   Diagnosis    Essential hypertension    Hyperlipidemia    SONJA on CPAP    BMI 45.0-49.9, adult    Chronic wheezy bronchitis    Edema of leg    Restrictive ventilatory defect    Controlled REM sleep behavior disorder    Anxiety    Arthritis    Bilateral carotid artery stenosis    Idiopathic chronic gout of right foot without tophus    Exercise hypoxemia    Screening for respiratory organ cancer    Tortuous aorta     she has been referred by Uday Inman MD for evaluation and management for   Chief Complaint   Patient presents with    Shortness of Breath    Exercise hypoxemia    Sleep Apnea       Chief Complaint: Shortness of Breath; Exercise hypoxemia; and Sleep Apnea    HPI:  Leanna Jefferson is a 74 y.o. female presents to pulmonary clinic follow up evaluation related to chronic bronchitis.  Last seen in clinic by Dr. Inman 8/26/2019.  Patient presents stable stating well-controlled on Pulmicort and albuterol nebs twice daily.  No longer wheezing.  No cough reported.  Spirometry 11/11/2019 revealed restrictive airflow defect with FVC 60% predicted.  No bronchodilator effect.    Current treatment regime Pulmicort nebs twice daily. albuterol nebs.    On home oxygen 2 L for exertion and sleep 2 L bled in to auto CPAP 6-16 cm orders per Dr. Inman, August 2019  Requalify with 6 min walk distance 8/12/2019.  Patient presents on home oxygen 2 L.     Previous Report Reviewed: lab reports, office notes and radiology reports     Past Medical History: The following portions of the patient's history were reviewed and updated as appropriate:   She  has no past surgical history on file.  Her family history is not on file.  She  reports that she quit smoking about 17 months ago. Her smoking use included cigarettes. She has a 29.00 pack-year smoking history. She has never used smokeless tobacco. She  "reports that she does not drink alcohol. Her drug history is not on file.  She has a current medication list which includes the following prescription(s): albuterol, allopurinol, aspirin, atorvastatin, benazepril-hydrochlorthiazide, budesonide, melatonin, and triamcinolone acetonide 0.1%.  She is allergic to codeine..    Review of Systems   Constitutional: Negative for fever, chills, weight loss, weight gain, activity change, appetite change, fatigue and night sweats.   HENT: Negative for postnasal drip, rhinorrhea, sinus pressure, voice change and congestion.    Eyes: Negative for redness and itching.   Respiratory: Negative for snoring, cough, sputum production, chest tightness, shortness of breath, wheezing, orthopnea, asthma nighttime symptoms, dyspnea on extertion, use of rescue inhaler and somnolence.    Cardiovascular: Negative.  Negative for chest pain, palpitations and leg swelling.   Genitourinary: Negative for difficulty urinating and hematuria.   Endocrine: Negative for cold intolerance and heat intolerance.    Musculoskeletal: Negative for arthralgias, gait problem, joint swelling and myalgias.   Skin: Negative.    Gastrointestinal: Negative for nausea, vomiting, abdominal pain and acid reflux.   Neurological: Negative for dizziness, weakness, light-headedness and headaches.   Hematological: Negative for adenopathy. No excessive bruising.   All other systems reviewed and are negative.       Objective:   /70   Pulse 89   Resp 18   Ht 5' 2" (1.575 m)   Wt 122.7 kg (270 lb 8.1 oz)   SpO2 98% Comment: 3 liters  BMI 49.48 kg/m²   Physical Exam   Constitutional: She is oriented to person, place, and time. She appears well-developed and well-nourished. She is active and cooperative.  Non-toxic appearance. She does not appear ill. No distress.   HENT:   Head: Normocephalic.   Right Ear: External ear normal.   Left Ear: External ear normal.   Nose: Nose normal.   Mouth/Throat: Oropharynx is clear and " moist. No oropharyngeal exudate.   Eyes: Conjunctivae are normal.   Neck: Normal range of motion. Neck supple.   Cardiovascular: Normal rate, regular rhythm, normal heart sounds and intact distal pulses.   Pulmonary/Chest: Effort normal and breath sounds normal. No stridor.   Abdominal: Soft.   Musculoskeletal: Normal range of motion.   Lymphadenopathy:     She has no cervical adenopathy.   Neurological: She is alert and oriented to person, place, and time.   Skin: Skin is warm and dry.   Psychiatric: She has a normal mood and affect. Her behavior is normal. Judgment and thought content normal.   Vitals reviewed.    Personal Diagnostic Review    Results for orders placed during the hospital encounter of 07/01/19   X-Ray Chest PA And Lateral    Narrative EXAMINATION:  XR CHEST PA AND LATERAL    CLINICAL HISTORY:  SOB; Shortness of breath    TECHNIQUE:  PA and lateral views of the chest were performed.    COMPARISON:  None    FINDINGS:  The heart size is within normal limits.  Aorta is mildly tortuous.  Lungs are hypoinflated but appear clear.  No pleural effusion.  Multilevel thoracic spondylosis.      Impression No acute disease.      Electronically signed by: SYDNIE Resendez MD  Date:    07/01/2019  Time:    08:34         6mwd  8/12/2019   Phase Oxygen Assessment Supplemental O2 Heart   Rate Blood Pressure [place]en Dyspnea Scale Rating   Resting 96 % Room Air 63 bpm 163/73 2   Exercise        Minute        1 84 % Room Air(placed on 2lpm) 88 bpm     2 93 % 2 L/M 88 bpm     3 93 % 2 L/M 97 bpm     4 93 % 2 L/M 96 bpm     5 100 % 2 L/M 97 bpm     6  97 % 2 L/M 101 bpm 170/81 5-6   Recovery        Minute        1 98 % 2 L/M 83 bpm     2 98 % 2 L/M 74 bpm     3 97 % 2.5 L/M 70 bpm     4 97 % 2 L/M 70 bpm 157/74 2     Six Minute Walk Summary  6MWT Status: completed without stopping  Patient Reported: Dyspnea, Leg pain, Other (Comment)(back pain(   lower))     Interpretation:  Did the patient stop or pause?:  No         Total Time Walked (Calculated): 360 seconds  Final Partial Lap Distance (feet): 0 feet  Total Distance Meters (Calculated): 304.8 meters  Predicted Distance Meters (Calculated): 298.23 meters   LLN was 159.23 m  Percentage of Predicted (Calculated): 102.2  Peak VO2 (Calculated): 13.12  Mets: 3.75  Has The Patient Had a Previous Six Minute Walk Test?: No       Previous 6MWT Results  Has The Patient Had a Previous Six Minute Walk Test?: No          CLINICAL INTERPRETATION:  Six minute walk distance is 304.8m (102.2 % predicted) with   moderate dyspnea.  During exercise, there was significant desaturation while   breathing room air.  SpO2 jose was 84 at 1st minute. Oxygen was added 2.0 LPM   Blood pressure increased significantly and Heart rate remained   stable with walking.  Hypertension was present prior to exercise.  This may represent a hypertensive and an abnormal cardiovascular   response to exercise.  The patient reported non-pulmonary symptoms during exercise.  Back pain.  The patient did complete the study, walking 360 seconds of the   360 second test.  The patient may benefit from using supplemental oxygen during   exertion.  No previous study performed.  Based upon age and body mass index, exercise capacity is normal.  Assessment:     1. Chronic wheezy bronchitis    2. Tortuous aorta    3. Exercise hypoxemia    4. Restrictive ventilatory defect    5. BMI 45.0-49.9, adult    6. SONJA on CPAP    7. Controlled REM sleep behavior disorder      Orders Placed This Encounter   Procedures    CPAP/BIPAP SUPPLIES     Benefits and compliant  90 day supply. 4 refills.  HME: [facility], patient would like to change to [facility] for CPAP supplies.     Order Specific Question:   Type of mask:     Answer:   FFM     Order Specific Question:   Headgear?     Answer:   Yes     Order Specific Question:   Tubing?     Answer:   Yes     Order Specific Question:   Humidifier chamber?     Answer:   Yes     Order  Specific Question:   Chin strap?     Answer:   Yes     Order Specific Question:   Filters?     Answer:   Yes     Order Specific Question:   Cushions?     Answer:   Yes     Order Specific Question:   Length of need (1-99 months):     Answer:   99    X-Ray Chest PA And Lateral     Standing Status:   Future     Standing Expiration Date:   11/11/2020     Order Specific Question:   May the Radiologist modify the order per protocol to meet the clinical needs of the patient?     Answer:   Yes     Medication List with Changes/Refills   Current Medications    ALBUTEROL (ACCUNEB) 1.25 MG/3 ML NEBU    TAKE 3MLS BY NEBULIZATION THREE TIMES A DAY AS NEEDED    ALLOPURINOL (ZYLOPRIM) 100 MG TABLET    Take 100 mg by mouth.    ASPIRIN (ECOTRIN) 81 MG EC TABLET    Take 81 mg by mouth once daily.    ATORVASTATIN (LIPITOR) 40 MG TABLET    Take 40 mg by mouth.    BENAZEPRIL-HYDROCHLORTHIAZIDE (LOTENSIN HCT) 20-25 MG TAB        BUDESONIDE (PULMICORT) 0.5 MG/2 ML NEBULIZER SOLUTION    Take 2 mLs (0.5 mg total) by nebulization 2 (two) times daily. Controller    MELATONIN 3 MG TAB    Take 1 tablet (3 mg total) by mouth every evening.    TRIAMCINOLONE ACETONIDE 0.1% (KENALOG) 0.1 % CREAM    APPLY TO AFFECTED AREA BID PRN     Plan:   Discussed diagnosis, its evaluation, treatment and usual course. All questions answered.  Problem List Items Addressed This Visit     Tortuous aorta    Overview     Seen on xray imaging, followed by cardiology Dr. Rosa Isela Bryan           Current Assessment & Plan     Seen on xray imaging, followed by cardiology Dr. Rosa Isela Bryan         Restrictive ventilatory defect    Relevant Orders    X-Ray Chest PA And Lateral    SONJA on CPAP    Overview     Based on study done 02/16/2018  Mild sleep apnea  Respiratory Events:  The polysomnogram revealed a presence of 30   obstructive, - central, and - mixed apneas resulting in an Apnea   index of 8.4 events per hour.  There were 2 hypopneas resulting   in a Hypopnea index  of 0.6 events per hour.  The combined   Apnea/Hypopnea (Respiratory Disturbance) index was 8.9 events per   Hour.  Started CPAP March 7, 2018. DME: [facility].  On auto CPAP 6-16 cm  Full face mask           Current Assessment & Plan     Did not bring in CPAP machine or sim card for download.  Patient hme: [facility] for CPAP.  Followed by pulmonary, Dr. Martha Bah last seen 10/10/2019.   Patient request changing to [facility] to have closer clinic follow up location at [facility].   She would like consideration to change to [facility] for supplies also.   PSG with Gutierrez 2/16/2019 mild obstructive sleep apnea AHI 8.4.     Per Dr. Martha Bah's note in care everywhere  On auto CPAP 6-16 cm. Full face mask.   CPAP download from 09/09/2019 to 10/08/2019. Equipment used 15/30 days. Average usage on nights used was 4 hr 54 minutes. Average AHI is 5.2/hr. Current pressure setting is 6-16 cmH2O.  Adherence encouraged   Supply order provided with request to chg to [facility] for supplies.                  Relevant Orders    CPAP/BIPAP SUPPLIES    Exercise hypoxemia    Current Assessment & Plan     Compliant with 2 L cannula with exertion.  Qualified with 6 min walk distance  8/12/2019.         Relevant Orders    X-Ray Chest PA And Lateral    Controlled REM sleep behavior disorder    Current Assessment & Plan     Improved with auto CPAP 6-16 cm with 2 L oxygen bled in  Melatonin         Chronic wheezy bronchitis - Primary    Current Assessment & Plan     Stable Pulmicort and albuterol nebs twice daily.  Spirometry 11/11/2019 restriction based on FVC.  No bronchodilator response on post [place].           Relevant Orders    X-Ray Chest PA And Lateral    BMI 45.0-49.9, adult    Current Assessment & Plan     Encouraged calorie reduction and 30 minutes of exercise daily. Discussed impact of obesity on general health.                 Follow up in about 6 months (around 5/11/2020) for exercise hypoxemia/chronic  bronchitis/aminah on cpap, bring in cpap machine.

## 2019-11-11 NOTE — LETTER
November 11, 2019      Uday Inman MD  [address]  [place] [address]           [facility]  [address] [place] [id number]  Phone: [phone]  Fax: [phone]          Patient: Leanna Jefferson   MR Number: [id number]   YOB: 1945   Date of Visit: 11/11/2019       Dear Dr. Uday Inman:    Thank you for referring Leanna Jefferson to me for evaluation. Attached you will find relevant portions of my assessment and plan of care.    If you have questions, please do not hesitate to call me. I look forward to following Leanna Jefferson along with you.    Sincerely,    Charito Dangelo, NP    Enclosure  CC:  No Recipients    If you would like to receive this communication electronically, please contact [email or web address] or [phone] to request more information on [facility] access.    For providers and/or their staff who would like to refer a patient to [facility], please contact us through our one-stop-shop provider referral line, [facility] , at [phone].    If you feel you have received this communication in error or would no longer like to receive these types of communications, please e-mail [email or web address]

## 2019-11-12 LAB
BRPFT: ABNORMAL
FEF 25 75 CHG: 56.8 %
FEF 25 75 LLN: 0.76
FEF 25 75 POST REF: 108.2 %
FEF 25 75 PRE REF: 69 %
FEF 25 75 REF: 1.7
FET100 CHG: -1.8 %
FEV1 CHG: 10 %
FEV1 FVC CHG: 5.1 %
FEV1 FVC LLN: 64
FEV1 FVC POST REF: 106.9 %
FEV1 FVC PRE REF: 101.8 %
FEV1 FVC REF: 78
FEV1 LLN: 1.45
FEV1 POST REF: 68.4 %
FEV1 PRE REF: 62.2 %
FEV1 REF: 2.02
FVC CHG: 4.7 %
FVC LLN: 1.88
FVC POST REF: 63.4 %
FVC PRE REF: 60.6 %
FVC REF: 2.61
PEF CHG: -6.5 %
PEF LLN: 3.56
PEF POST REF: 99.9 %
PEF PRE REF: 106.8 %
PEF REF: 5.2
POST FEF 25 75: 1.84 L/S (ref 0.76–2.65)
POST FET 100: 6.74 SEC
POST FEV1 FVC: 83.33 % (ref 64.17–91.68)
POST FEV1: 1.38 L (ref 1.45–2.58)
POST FVC: 1.66 L (ref 1.88–3.34)
POST PEF: 5.19 L/S (ref 3.56–6.84)
PRE FEF 25 75: 1.18 L/S (ref 0.76–2.65)
PRE FET 100: 6.86 SEC
PRE FEV1 FVC: 79.32 % (ref 64.17–91.68)
PRE FEV1: 1.25 L (ref 1.45–2.58)
PRE FVC: 1.58 L (ref 1.88–3.34)
PRE PEF: 5.55 L/S (ref 3.56–6.84)

## 2019-11-12 NOTE — ASSESSMENT & PLAN NOTE
Did not bring in CPAP machine or sim card for download.  Patient hme: [facility] for CPAP.  Followed by pulmonary, Dr. Martha Bah last seen 10/10/2019.   Patient request changing to [facility] to have closer clinic follow up location at [facility].   She would like consideration to change to [facility] for supplies also.   PSG with Gutierrez 2/16/2019 mild obstructive sleep apnea AHI 8.4.     Per Dr. Martha Bah's note in care everywhere  On auto CPAP 6-16 cm. Full face mask.   CPAP download from 09/09/2019 to 10/08/2019. Equipment used 15/30 days. Average usage on nights used was 4 hr 54 minutes. Average AHI is 5.2/hr. Current pressure setting is 6-16 cmH2O.  Adherence encouraged   Supply order provided with request to chg to [facility] for supplies.

## 2019-11-12 NOTE — ASSESSMENT & PLAN NOTE
Stable Pulmicort and albuterol nebs twice daily.  Spirometry 11/11/2019 restriction based on FVC.  No bronchodilator response on post [place].

## 2020-02-26 DIAGNOSIS — R94.2 RESTRICTIVE VENTILATORY DEFECT: ICD-10-CM

## 2020-02-26 DIAGNOSIS — J42 CHRONIC WHEEZY BRONCHITIS: ICD-10-CM

## 2020-02-26 RX ORDER — BUDESONIDE 0.5 MG/2ML
INHALANT ORAL
Qty: 120 ML | Refills: 3 | Status: SHIPPED | OUTPATIENT
Start: 2020-02-26 | End: 2020-02-26

## 2020-02-26 RX ORDER — BUDESONIDE 0.5 MG/2ML
INHALANT ORAL
Qty: 120 ML | Refills: 3 | Status: SHIPPED | OUTPATIENT
Start: 2020-02-26 | End: 2020-03-20 | Stop reason: SDUPTHER

## 2020-03-20 DIAGNOSIS — J42 CHRONIC WHEEZY BRONCHITIS: ICD-10-CM

## 2020-03-20 DIAGNOSIS — R94.2 RESTRICTIVE VENTILATORY DEFECT: ICD-10-CM

## 2020-03-20 RX ORDER — BUDESONIDE 0.5 MG/2ML
INHALANT ORAL
Qty: 120 ML | Refills: 3 | Status: SHIPPED | OUTPATIENT
Start: 2020-03-20 | End: 2020-03-24 | Stop reason: SDUPTHER

## 2020-03-20 RX ORDER — ALBUTEROL SULFATE 1.25 MG/3ML
SOLUTION RESPIRATORY (INHALATION)
Qty: 75 ML | Refills: 11 | Status: SHIPPED | OUTPATIENT
Start: 2020-03-20 | End: 2020-03-24 | Stop reason: SDUPTHER

## 2020-03-24 DIAGNOSIS — J42 CHRONIC WHEEZY BRONCHITIS: ICD-10-CM

## 2020-03-24 DIAGNOSIS — R94.2 RESTRICTIVE VENTILATORY DEFECT: ICD-10-CM

## 2020-03-24 RX ORDER — BUDESONIDE 0.5 MG/2ML
INHALANT ORAL
Qty: 120 ML | Refills: 3 | Status: SHIPPED | OUTPATIENT
Start: 2020-03-24 | End: 2020-09-16

## 2020-03-24 RX ORDER — ALBUTEROL SULFATE 1.25 MG/3ML
SOLUTION RESPIRATORY (INHALATION)
Qty: 75 ML | Refills: 11 | Status: SHIPPED | OUTPATIENT
Start: 2020-03-24 | End: 2021-06-24

## 2020-08-05 ENCOUNTER — OFFICE VISIT (OUTPATIENT)
Dept: PULMONOLOGY | Facility: CLINIC | Age: 75
End: 2020-08-05
Payer: MEDICARE

## 2020-08-05 ENCOUNTER — HOSPITAL ENCOUNTER (OUTPATIENT)
Dept: RADIOLOGY | Facility: HOSPITAL | Age: 75
Discharge: HOME | End: 2020-08-05
Attending: NURSE PRACTITIONER
Payer: MEDICARE

## 2020-08-05 VITALS
DIASTOLIC BLOOD PRESSURE: 90 MMHG | RESPIRATION RATE: 18 BRPM | WEIGHT: 267.06 LBS | SYSTOLIC BLOOD PRESSURE: 140 MMHG | HEART RATE: 75 BPM | BODY MASS INDEX: 49.15 KG/M2 | HEIGHT: 62 IN | OXYGEN SATURATION: 98 %

## 2020-08-05 DIAGNOSIS — R60.0 EDEMA OF LEG: ICD-10-CM

## 2020-08-05 DIAGNOSIS — R94.2 RESTRICTIVE VENTILATORY DEFECT: Primary | ICD-10-CM

## 2020-08-05 DIAGNOSIS — I77.1 TORTUOUS AORTA: ICD-10-CM

## 2020-08-05 DIAGNOSIS — R09.02 EXERCISE HYPOXEMIA: ICD-10-CM

## 2020-08-05 DIAGNOSIS — G47.33 OSA ON CPAP: ICD-10-CM

## 2020-08-05 DIAGNOSIS — J42 CHRONIC WHEEZY BRONCHITIS: ICD-10-CM

## 2020-08-05 DIAGNOSIS — I10 ESSENTIAL HYPERTENSION: ICD-10-CM

## 2020-08-05 DIAGNOSIS — E78.2 MIXED HYPERLIPIDEMIA: ICD-10-CM

## 2020-08-05 DIAGNOSIS — G47.52 CONTROLLED REM SLEEP BEHAVIOR DISORDER: ICD-10-CM

## 2020-08-05 DIAGNOSIS — R94.2 RESTRICTIVE VENTILATORY DEFECT: ICD-10-CM

## 2020-08-05 PROCEDURE — 1101F PR PT FALLS ASSESS DOC 0-1 FALLS W/OUT INJ PAST YR: ICD-10-PCS | Mod: CPTII,S$GLB,, | Performed by: INTERNAL MEDICINE

## 2020-08-05 PROCEDURE — 3008F BODY MASS INDEX DOCD: CPT | Mod: CPTII,S$GLB,, | Performed by: INTERNAL MEDICINE

## 2020-08-05 PROCEDURE — 1159F PR MEDICATION LIST DOCUMENTED IN MEDICAL RECORD: ICD-10-PCS | Mod: S$GLB,,, | Performed by: INTERNAL MEDICINE

## 2020-08-05 PROCEDURE — 3077F PR MOST RECENT SYSTOLIC BLOOD PRESSURE >= 140 MM HG: ICD-10-PCS | Mod: CPTII,S$GLB,, | Performed by: INTERNAL MEDICINE

## 2020-08-05 PROCEDURE — 3080F PR MOST RECENT DIASTOLIC BLOOD PRESSURE >= 90 MM HG: ICD-10-PCS | Mod: CPTII,S$GLB,, | Performed by: INTERNAL MEDICINE

## 2020-08-05 PROCEDURE — 71046 XR CHEST PA AND LATERAL: ICD-10-PCS | Mod: 26,,, | Performed by: RADIOLOGY

## 2020-08-05 PROCEDURE — 71046 X-RAY EXAM CHEST 2 VIEWS: CPT | Mod: TC

## 2020-08-05 PROCEDURE — 71046 X-RAY EXAM CHEST 2 VIEWS: CPT | Mod: 26,,, | Performed by: RADIOLOGY

## 2020-08-05 PROCEDURE — 3077F SYST BP >= 140 MM HG: CPT | Mod: CPTII,S$GLB,, | Performed by: INTERNAL MEDICINE

## 2020-08-05 PROCEDURE — 99999 PR PBB SHADOW E&M-EST. PATIENT-LVL III: ICD-10-PCS | Mod: PBBFAC,,, | Performed by: INTERNAL MEDICINE

## 2020-08-05 PROCEDURE — 1101F PT FALLS ASSESS-DOCD LE1/YR: CPT | Mod: CPTII,S$GLB,, | Performed by: INTERNAL MEDICINE

## 2020-08-05 PROCEDURE — 99999 PR PBB SHADOW E&M-EST. PATIENT-LVL III: CPT | Mod: PBBFAC,,, | Performed by: INTERNAL MEDICINE

## 2020-08-05 PROCEDURE — 99214 OFFICE O/P EST MOD 30 MIN: CPT | Mod: S$GLB,,, | Performed by: INTERNAL MEDICINE

## 2020-08-05 PROCEDURE — 3008F PR BODY MASS INDEX (BMI) DOCUMENTED: ICD-10-PCS | Mod: CPTII,S$GLB,, | Performed by: INTERNAL MEDICINE

## 2020-08-05 PROCEDURE — 1159F MED LIST DOCD IN RCRD: CPT | Mod: S$GLB,,, | Performed by: INTERNAL MEDICINE

## 2020-08-05 PROCEDURE — 99214 PR OFFICE/OUTPT VISIT, EST, LEVL IV, 30-39 MIN: ICD-10-PCS | Mod: S$GLB,,, | Performed by: INTERNAL MEDICINE

## 2020-08-05 PROCEDURE — 3080F DIAST BP >= 90 MM HG: CPT | Mod: CPTII,S$GLB,, | Performed by: INTERNAL MEDICINE

## 2020-08-05 NOTE — PROGRESS NOTES
Subjective:       Patient ID: Leanna Maddox is a 75 y.o. female.  Patient Active Problem List   Diagnosis    Essential hypertension    Hyperlipidemia    SONJA on CPAP    BMI 45.0-49.9, adult    Chronic wheezy bronchitis    Edema of leg    Restrictive ventilatory defect    Controlled REM sleep behavior disorder    Anxiety    Arthritis    Bilateral carotid artery stenosis    Idiopathic chronic gout of right foot without tophus    Exercise hypoxemia    Screening for respiratory organ cancer    Tortuous aorta     Immunization History   Administered Date(s) Administered    Influenza - High Dose - PF (65 years and older) 2012, 2014, 2015, 2018    Influenza - Trivalent - PF (ADULT) 10/15/2013    Pneumococcal Conjugate - 13 Valent 2018    Pneumococcal Polysaccharide - 23 Valent 2011    Tdap 2018     Social History     Tobacco Use   Smoking Status Former Smoker    Packs/day: 0.50    Years: 58.00    Pack years: 29.00    Types: Cigarettes    Quit date: 2018    Years since quittin.1   Smokeless Tobacco Never Used     EPWORTH SLEEPINESS SCALE 2020   Sitting and reading 1   Watching TV 1   Sitting, inactive in a public place (e.g. a theatre or a meeting) 0   As a passenger in a car for an hour without a break 0   Lying down to rest in the afternoon when circumstances permit 1   Sitting and talking to someone 0   Sitting quietly after a lunch without alcohol 0   In a car, while stopped for a few minutes in traffic 0   Total score 3        Chief Complaint:  Leanna MADDOX 73.age   She is also treated for other comorbidities including obstructive sleep apnea on CPAP, REM behavioral disorder, gout,  Restrictive defect and Chronic wheezy bronchitis  Here to review: CXR  Qualified for oxygen. POC 2.0 LPM.  Quit smoking 3 years.    No cough no wheezing no shortness of breath   No interval exacerbations  She was  on her oxygen   Discussed lower extremity  edema adherence to stockings  Annual follow-up has been entered      The following portions of the patient's history were reviewed and updated as appropriate:   She  has a past medical history of CHF (congestive heart failure), Gout, and Hypertension.  She does not have any pertinent problems on file.  She  has no past surgical history on file.  Her family history is not on file.  She  reports that she quit smoking about 2 years ago. Her smoking use included cigarettes. She has a 29.00 pack-year smoking history. She has never used smokeless tobacco. She reports that she does not drink alcohol. No history on file for drug.  She has a current medication list which includes the following prescription(s): albuterol, allopurinol, aspirin, atorvastatin, benazepril-hydrochlorthiazide, budesonide, melatonin, and triamcinolone acetonide 0.1%.  Current Outpatient Medications on File Prior to Visit   Medication Sig Dispense Refill    albuterol (ACCUNEB) 1.25 mg/3 mL Nebu TAKE 3MLS BY NEBULIZATION THREE TIMES A DAY AS NEEDED 75 mL 11    allopurinol (ZYLOPRIM) 100 MG tablet Take 100 mg by mouth.      aspirin (ECOTRIN) 81 MG EC tablet Take 81 mg by mouth once daily.      atorvastatin (LIPITOR) 40 MG tablet Take 40 mg by mouth.      benazepril-hydrochlorthiazide (LOTENSIN HCT) 20-25 mg Tab       budesonide (PULMICORT) 0.5 mg/2 mL nebulizer solution USE 1 VIAL VIA NEBULIZER TWICE DAILY 120 mL 3    melatonin 3 mg Tab Take 1 tablet (3 mg total) by mouth every evening. 90 tablet 3    triamcinolone acetonide 0.1% (KENALOG) 0.1 % cream APPLY TO AFFECTED AREA BID PRN       No current facility-administered medications on file prior to visit.      She is allergic to codeine..      Review of Systems   Constitutional: Negative for malaise/fatigue.   HENT: Negative.    Eyes: Negative.    Respiratory: Negative for cough, hemoptysis, sputum production, shortness of breath and wheezing.    Cardiovascular: Positive for leg swelling.  "  Gastrointestinal: Negative.    Genitourinary: Negative.    Musculoskeletal: Negative.    Skin: Negative.    Neurological: Negative.    Endo/Heme/Allergies: Negative.    Psychiatric/Behavioral: Negative.         Objective:        Vitals:    08/05/20 1329   BP: (!) 140/90   Pulse: 75   Resp: 18   SpO2: 98%   Weight: 121.2 kg (267 lb 1.4 oz)   Height: 5' 2" (1.575 m)       Physical Exam  Vitals signs and nursing note reviewed.   Constitutional:       Appearance: She is well-developed.   HENT:      Head: Normocephalic and atraumatic.      Nose: Nose normal.      Mouth/Throat:      Pharynx: No oropharyngeal exudate.   Eyes:      Conjunctiva/sclera: Conjunctivae normal.      Pupils: Pupils are equal, round, and reactive to light.   Neck:      Musculoskeletal: Normal range of motion and neck supple.   Cardiovascular:      Rate and Rhythm: Normal rate and regular rhythm.      Heart sounds: Murmur present.   Pulmonary:      Effort: Pulmonary effort is normal. No respiratory distress.      Breath sounds: No stridor. No wheezing, rhonchi or rales.   Chest:      Chest wall: No tenderness.   Abdominal:      General: Bowel sounds are normal.      Palpations: Abdomen is soft.   Musculoskeletal: Normal range of motion.   Skin:     General: Skin is warm.      Capillary Refill: Capillary refill takes 2 to 3 seconds.   Neurological:      General: No focal deficit present.      Mental Status: She is alert and oriented to person, place, and time.           Data Review: CBC: No results found for: WBC, RBC, HGB, HCT, PLT  BMP:   Lab Results   Component Value Date    CREATININE 0.9 07/06/2019     Radiology review:    EXAMINATION:  XR CHEST PA AND LATERAL     CLINICAL HISTORY:  Unspecified chronic bronchitis     TECHNIQUE:  PA and lateral views of the chest were performed.     COMPARISON:  07/01/2019     FINDINGS:  Cardiac silhouette remains mildly enlarged.    Mild diffuse prominence of the bronchovascular and interstitial pulmonary " markings is unchanged.  No focal parenchymal consolidation or definite pleural effusion visualized.  Multilevel degenerative findings noted throughout the visualized spine.     Impression:     Unchanged appearance of the chest as above      Assessment:      Problem List Items Addressed This Visit     Essential hypertension      Stable on benazepril hydrochlorothiazide.         Hyperlipidemia      Stable on atorvastatin         SONJA on CPAP      [place] sleepiness score 3   Patient verbalizes using CPAP and benefits  Need to bring device in for download         BMI 45.0-49.9, adult     General weight loss/lifestyle modification strategies discussed (elicit support from others; identify saboteurs; non-food rewards).  Diet interventions: low calorie (1000 kCal/d) deficit diet          Chronic wheezy bronchitis     Stable Pulmicort and albuterol nebs twice daily.  Spirometry 11/11/2019 restriction based on FVC.    No bronchodilator response on post [place].           Edema of leg     Compression stockings  20-30 mmHg per Dr Bryan         Restrictive ventilatory defect - Primary     FVC : 1.62L ( 63.6%), TLC 3.58L( 78.2%)   related to body habitus         Relevant Orders    Spirometry without Bronchodilator    X-Ray Chest PA And Lateral    Controlled REM sleep behavior disorder     STABLE auto CPAP 6-16 cm with 2 L oxygen bled in  No guns and sharp objects in bedroom  Melatonin         Exercise hypoxemia     POC 2 LPM         Tortuous aorta      Seen on x-ray imaging follow-up with Cardiology              Plan:           stable   CPAP adherence  Weight loss  Requested Prescriptions      No prescriptions requested or ordered in this encounter     Orders Placed This Encounter   Procedures    X-Ray Chest PA And Lateral     Standing Status:   Future     Standing Expiration Date:   8/5/2021    Spirometry without Bronchodilator     Standing Status:   Future     Standing Expiration Date:   8/5/2021         Follow up in about 1  year (around 8/5/2021), or cxr. david, download, weight loss, wear TORI stockings.    This note was prepared using voice recognition system and is likely to have sound alike errors that may have been overlooked even after proof reading.  Please call me with any questions    Discussed diagnosis, its evaluation, treatment and usual course. All questions answered.    Thank you for the courtesy of participating in the care of this patient    Uday Inman MD

## 2020-08-05 NOTE — ASSESSMENT & PLAN NOTE
Stable Pulmicort and albuterol nebs twice daily.  Spirometry 11/11/2019 restriction based on FVC.    No bronchodilator response on post [place].

## 2020-08-05 NOTE — ASSESSMENT & PLAN NOTE
[place] sleepiness score 3   Patient verbalizes using CPAP and benefits  Need to bring device in for download

## 2021-03-05 DIAGNOSIS — J42 CHRONIC WHEEZY BRONCHITIS: ICD-10-CM

## 2021-03-05 DIAGNOSIS — R94.2 RESTRICTIVE VENTILATORY DEFECT: ICD-10-CM

## 2021-03-05 RX ORDER — BUDESONIDE 0.5 MG/2ML
INHALANT ORAL
Qty: 360 ML | Refills: 0 | Status: SHIPPED | OUTPATIENT
Start: 2021-03-05 | End: 2021-03-08 | Stop reason: SDUPTHER

## 2021-03-08 DIAGNOSIS — J42 CHRONIC WHEEZY BRONCHITIS: ICD-10-CM

## 2021-03-08 DIAGNOSIS — R94.2 RESTRICTIVE VENTILATORY DEFECT: ICD-10-CM

## 2021-03-08 RX ORDER — BUDESONIDE 0.5 MG/2ML
INHALANT ORAL
Qty: 360 ML | Refills: 0 | Status: SHIPPED | OUTPATIENT
Start: 2021-03-08 | End: 2021-11-02 | Stop reason: SDUPTHER

## 2021-03-18 ENCOUNTER — OFFICE VISIT (OUTPATIENT)
Dept: PODIATRY | Facility: CLINIC | Age: 76
End: 2021-03-18
Payer: MEDICARE

## 2021-03-18 VITALS
DIASTOLIC BLOOD PRESSURE: 69 MMHG | HEART RATE: 61 BPM | WEIGHT: 271.81 LBS | SYSTOLIC BLOOD PRESSURE: 144 MMHG | BODY MASS INDEX: 50.02 KG/M2 | HEIGHT: 62 IN

## 2021-03-18 DIAGNOSIS — I87.2 VENOUS INSUFFICIENCY OF BOTH LOWER EXTREMITIES: Primary | ICD-10-CM

## 2021-03-18 PROCEDURE — 3008F BODY MASS INDEX DOCD: CPT | Mod: CPTII,S$GLB,, | Performed by: PODIATRIST

## 2021-03-18 PROCEDURE — 3078F DIAST BP <80 MM HG: CPT | Mod: CPTII,S$GLB,, | Performed by: PODIATRIST

## 2021-03-18 PROCEDURE — 99203 OFFICE O/P NEW LOW 30 MIN: CPT | Mod: S$GLB,,, | Performed by: PODIATRIST

## 2021-03-18 PROCEDURE — 3008F PR BODY MASS INDEX (BMI) DOCUMENTED: ICD-10-PCS | Mod: CPTII,S$GLB,, | Performed by: PODIATRIST

## 2021-03-18 PROCEDURE — 1159F PR MEDICATION LIST DOCUMENTED IN MEDICAL RECORD: ICD-10-PCS | Mod: S$GLB,,, | Performed by: PODIATRIST

## 2021-03-18 PROCEDURE — 3288F PR FALLS RISK ASSESSMENT DOCUMENTED: ICD-10-PCS | Mod: CPTII,S$GLB,, | Performed by: PODIATRIST

## 2021-03-18 PROCEDURE — 3077F PR MOST RECENT SYSTOLIC BLOOD PRESSURE >= 140 MM HG: ICD-10-PCS | Mod: CPTII,S$GLB,, | Performed by: PODIATRIST

## 2021-03-18 PROCEDURE — 1125F AMNT PAIN NOTED PAIN PRSNT: CPT | Mod: S$GLB,,, | Performed by: PODIATRIST

## 2021-03-18 PROCEDURE — 3077F SYST BP >= 140 MM HG: CPT | Mod: CPTII,S$GLB,, | Performed by: PODIATRIST

## 2021-03-18 PROCEDURE — 1159F MED LIST DOCD IN RCRD: CPT | Mod: S$GLB,,, | Performed by: PODIATRIST

## 2021-03-18 PROCEDURE — 1101F PT FALLS ASSESS-DOCD LE1/YR: CPT | Mod: CPTII,S$GLB,, | Performed by: PODIATRIST

## 2021-03-18 PROCEDURE — 99203 PR OFFICE/OUTPT VISIT, NEW, LEVL III, 30-44 MIN: ICD-10-PCS | Mod: S$GLB,,, | Performed by: PODIATRIST

## 2021-03-18 PROCEDURE — 3078F PR MOST RECENT DIASTOLIC BLOOD PRESSURE < 80 MM HG: ICD-10-PCS | Mod: CPTII,S$GLB,, | Performed by: PODIATRIST

## 2021-03-18 PROCEDURE — 99999 PR PBB SHADOW E&M-EST. PATIENT-LVL IV: CPT | Mod: PBBFAC,,, | Performed by: PODIATRIST

## 2021-03-18 PROCEDURE — 3288F FALL RISK ASSESSMENT DOCD: CPT | Mod: CPTII,S$GLB,, | Performed by: PODIATRIST

## 2021-03-18 PROCEDURE — 1125F PR PAIN SEVERITY QUANTIFIED, PAIN PRESENT: ICD-10-PCS | Mod: S$GLB,,, | Performed by: PODIATRIST

## 2021-03-18 PROCEDURE — 99999 PR PBB SHADOW E&M-EST. PATIENT-LVL IV: ICD-10-PCS | Mod: PBBFAC,,, | Performed by: PODIATRIST

## 2021-03-18 PROCEDURE — 1101F PR PT FALLS ASSESS DOC 0-1 FALLS W/OUT INJ PAST YR: ICD-10-PCS | Mod: CPTII,S$GLB,, | Performed by: PODIATRIST

## 2021-03-23 ENCOUNTER — TELEPHONE (OUTPATIENT)
Dept: RADIOLOGY | Facility: HOSPITAL | Age: 76
End: 2021-03-23

## 2021-03-24 ENCOUNTER — HOSPITAL ENCOUNTER (OUTPATIENT)
Dept: RADIOLOGY | Facility: HOSPITAL | Age: 76
Discharge: HOME | End: 2021-03-24
Attending: PODIATRIST
Payer: MEDICARE

## 2021-03-24 DIAGNOSIS — I87.2 VENOUS INSUFFICIENCY OF BOTH LOWER EXTREMITIES: ICD-10-CM

## 2021-03-24 PROCEDURE — 93970 US LOWER EXTREMITY VEINS BILATERAL: ICD-10-PCS | Mod: 26,,, | Performed by: RADIOLOGY

## 2021-03-24 PROCEDURE — 93970 EXTREMITY STUDY: CPT | Mod: 26,,, | Performed by: RADIOLOGY

## 2021-03-24 PROCEDURE — 93970 EXTREMITY STUDY: CPT | Mod: TC

## 2021-04-28 ENCOUNTER — PATIENT MESSAGE (OUTPATIENT)
Dept: RESEARCH | Facility: HOSPITAL | Age: 76
End: 2021-04-28

## 2021-09-21 ENCOUNTER — PATIENT MESSAGE (OUTPATIENT)
Dept: PODIATRY | Facility: CLINIC | Age: 76
End: 2021-09-21

## 2021-11-01 ENCOUNTER — TELEPHONE (OUTPATIENT)
Dept: PULMONOLOGY | Facility: CLINIC | Age: 76
End: 2021-11-01
Payer: MEDICARE

## 2021-11-02 ENCOUNTER — CLINICAL SUPPORT (OUTPATIENT)
Dept: PULMONOLOGY | Facility: CLINIC | Age: 76
End: 2021-11-02
Payer: MEDICARE

## 2021-11-02 ENCOUNTER — OFFICE VISIT (OUTPATIENT)
Dept: PULMONOLOGY | Facility: CLINIC | Age: 76
End: 2021-11-02
Payer: MEDICARE

## 2021-11-02 VITALS
OXYGEN SATURATION: 98 % | HEART RATE: 62 BPM | RESPIRATION RATE: 16 BRPM | SYSTOLIC BLOOD PRESSURE: 130 MMHG | WEIGHT: 276.69 LBS | BODY MASS INDEX: 50.91 KG/M2 | DIASTOLIC BLOOD PRESSURE: 84 MMHG | HEIGHT: 62 IN

## 2021-11-02 DIAGNOSIS — E66.01 CLASS 3 SEVERE OBESITY DUE TO EXCESS CALORIES WITH SERIOUS COMORBIDITY AND BODY MASS INDEX (BMI) OF 50.0 TO 59.9 IN ADULT: ICD-10-CM

## 2021-11-02 DIAGNOSIS — R09.02 EXERCISE HYPOXEMIA: ICD-10-CM

## 2021-11-02 DIAGNOSIS — R09.02 HYPOXEMIA REQUIRING SUPPLEMENTAL OXYGEN: ICD-10-CM

## 2021-11-02 DIAGNOSIS — J42 CHRONIC WHEEZY BRONCHITIS: ICD-10-CM

## 2021-11-02 DIAGNOSIS — R60.0 EDEMA OF LEG: ICD-10-CM

## 2021-11-02 DIAGNOSIS — I10 ESSENTIAL HYPERTENSION: ICD-10-CM

## 2021-11-02 DIAGNOSIS — Z99.81 HYPOXEMIA REQUIRING SUPPLEMENTAL OXYGEN: ICD-10-CM

## 2021-11-02 DIAGNOSIS — R94.2 RESTRICTIVE VENTILATORY DEFECT: ICD-10-CM

## 2021-11-02 DIAGNOSIS — G47.33 OSA ON CPAP: ICD-10-CM

## 2021-11-02 DIAGNOSIS — G47.52 CONTROLLED REM SLEEP BEHAVIOR DISORDER: ICD-10-CM

## 2021-11-02 DIAGNOSIS — G47.33 OSA ON CPAP: Primary | ICD-10-CM

## 2021-11-02 DIAGNOSIS — I77.1 TORTUOUS AORTA: ICD-10-CM

## 2021-11-02 PROBLEM — E66.813 CLASS 3 SEVERE OBESITY DUE TO EXCESS CALORIES WITH SERIOUS COMORBIDITY AND BODY MASS INDEX (BMI) OF 50.0 TO 59.9 IN ADULT: Status: ACTIVE | Noted: 2021-11-02

## 2021-11-02 PROCEDURE — 99215 PR OFFICE/OUTPT VISIT, EST, LEVL V, 40-54 MIN: ICD-10-PCS | Mod: S$GLB,,, | Performed by: NURSE PRACTITIONER

## 2021-11-02 PROCEDURE — 3075F PR MOST RECENT SYSTOLIC BLOOD PRESS GE 130-139MM HG: ICD-10-PCS | Mod: CPTII,S$GLB,, | Performed by: NURSE PRACTITIONER

## 2021-11-02 PROCEDURE — 3079F DIAST BP 80-89 MM HG: CPT | Mod: CPTII,S$GLB,, | Performed by: NURSE PRACTITIONER

## 2021-11-02 PROCEDURE — 99215 OFFICE O/P EST HI 40 MIN: CPT | Mod: S$GLB,,, | Performed by: NURSE PRACTITIONER

## 2021-11-02 PROCEDURE — 99999 PR PBB SHADOW E&M-EST. PATIENT-LVL IV: CPT | Mod: PBBFAC,,, | Performed by: NURSE PRACTITIONER

## 2021-11-02 PROCEDURE — 94762 PR NONINVASV OXYGEN SATUR, CONT: ICD-10-PCS | Mod: S$GLB,,, | Performed by: INTERNAL MEDICINE

## 2021-11-02 PROCEDURE — 99999 PR PBB SHADOW E&M-EST. PATIENT-LVL IV: ICD-10-PCS | Mod: PBBFAC,,, | Performed by: NURSE PRACTITIONER

## 2021-11-02 PROCEDURE — 1159F MED LIST DOCD IN RCRD: CPT | Mod: CPTII,S$GLB,, | Performed by: NURSE PRACTITIONER

## 2021-11-02 PROCEDURE — 1160F PR REVIEW ALL MEDS BY PRESCRIBER/CLIN PHARMACIST DOCUMENTED: ICD-10-PCS | Mod: CPTII,S$GLB,, | Performed by: NURSE PRACTITIONER

## 2021-11-02 PROCEDURE — 94762 N-INVAS EAR/PLS OXIMTRY CONT: CPT | Mod: S$GLB,,, | Performed by: INTERNAL MEDICINE

## 2021-11-02 PROCEDURE — 4010F ACE/ARB THERAPY RXD/TAKEN: CPT | Mod: CPTII,S$GLB,, | Performed by: NURSE PRACTITIONER

## 2021-11-02 PROCEDURE — 3075F SYST BP GE 130 - 139MM HG: CPT | Mod: CPTII,S$GLB,, | Performed by: NURSE PRACTITIONER

## 2021-11-02 PROCEDURE — 3079F PR MOST RECENT DIASTOLIC BLOOD PRESSURE 80-89 MM HG: ICD-10-PCS | Mod: CPTII,S$GLB,, | Performed by: NURSE PRACTITIONER

## 2021-11-02 PROCEDURE — 1160F RVW MEDS BY RX/DR IN RCRD: CPT | Mod: CPTII,S$GLB,, | Performed by: NURSE PRACTITIONER

## 2021-11-02 PROCEDURE — 1159F PR MEDICATION LIST DOCUMENTED IN MEDICAL RECORD: ICD-10-PCS | Mod: CPTII,S$GLB,, | Performed by: NURSE PRACTITIONER

## 2021-11-02 PROCEDURE — 4010F PR ACE/ARB THEARPY RXD/TAKEN: ICD-10-PCS | Mod: CPTII,S$GLB,, | Performed by: NURSE PRACTITIONER

## 2021-11-02 RX ORDER — ALBUTEROL SULFATE 1.25 MG/3ML
1.25 SOLUTION RESPIRATORY (INHALATION) EVERY 4 HOURS PRN
Qty: 360 ML | Refills: 3 | Status: SHIPPED | OUTPATIENT
Start: 2021-11-02 | End: 2022-09-22

## 2021-11-02 RX ORDER — BUDESONIDE 0.5 MG/2ML
INHALANT ORAL
Qty: 180 ML | Refills: 3 | Status: SHIPPED | OUTPATIENT
Start: 2021-11-02 | End: 2021-11-02

## 2021-11-02 RX ORDER — BUDESONIDE 0.5 MG/2ML
INHALANT ORAL
Qty: 180 ML | Refills: 3 | Status: SHIPPED | OUTPATIENT
Start: 2021-11-02 | End: 2022-09-23

## 2021-11-04 ENCOUNTER — TELEPHONE (OUTPATIENT)
Dept: PULMONOLOGY | Facility: CLINIC | Age: 76
End: 2021-11-04
Payer: MEDICARE

## 2021-11-23 ENCOUNTER — TELEPHONE (OUTPATIENT)
Dept: PULMONOLOGY | Facility: CLINIC | Age: 76
End: 2021-11-23
Payer: MEDICARE

## 2022-10-11 ENCOUNTER — TELEPHONE (OUTPATIENT)
Dept: PULMONOLOGY | Facility: CLINIC | Age: 77
End: 2022-10-11
Payer: MEDICARE

## 2022-10-11 NOTE — TELEPHONE ENCOUNTER
----- Message from Kaylee Tomlinson sent at 10/11/2022  9:42 AM CDT -----  Contact: Leanna Ram is calling with questions and concerns on medication albuterol (ACCUNEB) 1.25 mg/3 mL Nebu. She Would like to know why the medication was increased. Please give patient a call back at [phone]. She states she don't think the Doctor increased the medication    Thanks  LJ

## 2022-11-02 ENCOUNTER — CLINICAL SUPPORT (OUTPATIENT)
Dept: PULMONOLOGY | Facility: CLINIC | Age: 77
End: 2022-11-02
Payer: MEDICARE

## 2022-11-02 VITALS — BODY MASS INDEX: 50.1 KG/M2 | WEIGHT: 272.25 LBS | HEIGHT: 62 IN

## 2022-11-02 DIAGNOSIS — E66.01 CLASS 3 SEVERE OBESITY DUE TO EXCESS CALORIES WITH SERIOUS COMORBIDITY AND BODY MASS INDEX (BMI) OF 50.0 TO 59.9 IN ADULT: ICD-10-CM

## 2022-11-02 DIAGNOSIS — R09.02 EXERCISE HYPOXEMIA: ICD-10-CM

## 2022-11-02 DIAGNOSIS — Z99.81 HYPOXEMIA REQUIRING SUPPLEMENTAL OXYGEN: ICD-10-CM

## 2022-11-02 DIAGNOSIS — R94.2 RESTRICTIVE VENTILATORY DEFECT: ICD-10-CM

## 2022-11-02 DIAGNOSIS — R09.02 HYPOXEMIA REQUIRING SUPPLEMENTAL OXYGEN: ICD-10-CM

## 2022-11-02 DIAGNOSIS — J42 CHRONIC WHEEZY BRONCHITIS: ICD-10-CM

## 2022-11-02 LAB
BRPFT: NORMAL
FEF 25 75 LLN: 0.69
FEF 25 75 PRE REF: 104.6 %
FEF 25 75 REF: 1.58
FEV1 FVC LLN: 64
FEV1 FVC PRE REF: 104.7 %
FEV1 FVC REF: 78
FEV1 LLN: 1.34
FEV1 PRE REF: 70.3 %
FEV1 REF: 1.89
FVC LLN: 1.74
FVC PRE REF: 66.5 %
FVC REF: 2.45
PEF LLN: 3.23
PEF PRE REF: 88 %
PEF REF: 4.83
PRE FEF 25 75: 1.66 L/S
PRE FET 100: 7.41 SEC
PRE FEV1 FVC: 81.35 %
PRE FEV1: 1.33 L
PRE FVC: 1.63 L
PRE PEF: 4.25 L/S

## 2022-11-02 PROCEDURE — 94010 BREATHING CAPACITY TEST: CPT | Mod: 59,S$GLB,, | Performed by: INTERNAL MEDICINE

## 2022-11-02 PROCEDURE — 94618 PULMONARY STRESS TESTING: CPT | Mod: S$GLB,,, | Performed by: INTERNAL MEDICINE

## 2022-11-02 PROCEDURE — 94010 BREATHING CAPACITY TEST: ICD-10-PCS | Mod: 59,S$GLB,, | Performed by: INTERNAL MEDICINE

## 2022-11-02 PROCEDURE — 94618 PULMONARY STRESS TESTING: ICD-10-PCS | Mod: S$GLB,,, | Performed by: INTERNAL MEDICINE

## 2022-11-02 NOTE — PROCEDURES
"[facility] - Pulmonary Function  Six Minute Walk     SUMMARY     Ordering Provider: MADELINE Dangelo NP   Interpreting Provider: Dr Franklin  Performing nurse/tech/RT: GUI RRT  Diagnosis:  (Restrictive ventilatory defect, hypoxemia)  Height: 5' 2" (157.5 cm)  Weight: 123.5 kg (272 lb 4.3 oz)  BMI (Calculated): 49.8   Patient Race:             Phase Oxygen Assessment Supplemental O2 Heart   Rate Blood Pressure Charleen Dyspnea Scale Rating   Resting 97 % Room Air 65 bpm 136/76 2   Exercise        Minute        1 97 % Room Air 83 bpm     2 97 % Room Air 93 bpm     3 97 % Room Air 98 bpm     4 97 % Room Air 100 bpm     5 97 % Room Air 102 bpm     6  97 % Room Air 102 bpm 185/79 4   Recovery        Minute        1 98 % Room Air 83 bpm     2 99 % Room Air 76 bpm     3 99 % Room Air 76 bpm     4 99 % Room Air 75 bpm 169/74 2     Six Minute Walk Summary  6MWT Status: completed without stopping  Patient Reported: Dyspnea (chest tightness, hip and back pain)     Interpretation:  Did the patient stop or pause?: No                                         Total Time Walked (Calculated): 360 seconds  Final Partial Lap Distance (feet): 50 feet  Total Distance Meters (Calculated): 259.08 meters  Predicted Distance Meters (Calculated): 277.23 meters  Percentage of Predicted (Calculated): 93.45  Peak VO2 (Calculated): 11.75  Mets: 3.36  Has The Patient Had a Previous Six Minute Walk Test?: Yes       Previous 6MWT Results  Has The Patient Had a Previous Six Minute Walk Test?: Yes  Date of Previous Test: 08/12/19  Total Time Walked: 360 seconds  Total Distance (meters): 304.8  Predicted Distance (meters): 298.23 meters  Percentage of Predicted: 102.2  Percent Change (Calculated): 0.15    "

## 2022-11-03 ENCOUNTER — TELEPHONE (OUTPATIENT)
Dept: PULMONOLOGY | Facility: CLINIC | Age: 77
End: 2022-11-03
Payer: MEDICARE

## 2022-12-02 ENCOUNTER — OFFICE VISIT (OUTPATIENT)
Dept: PULMONOLOGY | Facility: CLINIC | Age: 77
End: 2022-12-02
Payer: MEDICARE

## 2022-12-02 ENCOUNTER — HOSPITAL ENCOUNTER (OUTPATIENT)
Dept: RADIOLOGY | Facility: HOSPITAL | Age: 77
Discharge: HOME | End: 2022-12-02
Attending: NURSE PRACTITIONER
Payer: MEDICARE

## 2022-12-02 VITALS
OXYGEN SATURATION: 95 % | WEIGHT: 271.06 LBS | DIASTOLIC BLOOD PRESSURE: 76 MMHG | RESPIRATION RATE: 17 BRPM | HEIGHT: 62 IN | SYSTOLIC BLOOD PRESSURE: 126 MMHG | BODY MASS INDEX: 49.88 KG/M2 | HEART RATE: 78 BPM

## 2022-12-02 DIAGNOSIS — R09.02 EXERCISE HYPOXEMIA: ICD-10-CM

## 2022-12-02 DIAGNOSIS — G47.52 CONTROLLED REM SLEEP BEHAVIOR DISORDER: ICD-10-CM

## 2022-12-02 DIAGNOSIS — I77.1 TORTUOUS AORTA: ICD-10-CM

## 2022-12-02 DIAGNOSIS — J42 CHRONIC WHEEZY BRONCHITIS: ICD-10-CM

## 2022-12-02 DIAGNOSIS — J42 CHRONIC WHEEZY BRONCHITIS: Primary | ICD-10-CM

## 2022-12-02 DIAGNOSIS — I10 ESSENTIAL HYPERTENSION: ICD-10-CM

## 2022-12-02 DIAGNOSIS — G47.33 OSA ON CPAP: ICD-10-CM

## 2022-12-02 DIAGNOSIS — Z12.2 SCREENING FOR RESPIRATORY ORGAN CANCER: ICD-10-CM

## 2022-12-02 DIAGNOSIS — R94.2 RESTRICTIVE VENTILATORY DEFECT: ICD-10-CM

## 2022-12-02 DIAGNOSIS — R60.0 EDEMA OF LEG: ICD-10-CM

## 2022-12-02 DIAGNOSIS — E78.2 MIXED HYPERLIPIDEMIA: ICD-10-CM

## 2022-12-02 PROCEDURE — 99214 OFFICE O/P EST MOD 30 MIN: CPT | Mod: S$GLB,,, | Performed by: INTERNAL MEDICINE

## 2022-12-02 PROCEDURE — 1160F RVW MEDS BY RX/DR IN RCRD: CPT | Mod: CPTII,S$GLB,, | Performed by: INTERNAL MEDICINE

## 2022-12-02 PROCEDURE — 1160F PR REVIEW ALL MEDS BY PRESCRIBER/CLIN PHARMACIST DOCUMENTED: ICD-10-PCS | Mod: CPTII,S$GLB,, | Performed by: INTERNAL MEDICINE

## 2022-12-02 PROCEDURE — 71046 X-RAY EXAM CHEST 2 VIEWS: CPT | Mod: 26,,, | Performed by: RADIOLOGY

## 2022-12-02 PROCEDURE — 3288F PR FALLS RISK ASSESSMENT DOCUMENTED: ICD-10-PCS | Mod: CPTII,S$GLB,, | Performed by: INTERNAL MEDICINE

## 2022-12-02 PROCEDURE — 3078F PR MOST RECENT DIASTOLIC BLOOD PRESSURE < 80 MM HG: ICD-10-PCS | Mod: CPTII,S$GLB,, | Performed by: INTERNAL MEDICINE

## 2022-12-02 PROCEDURE — 71046 X-RAY EXAM CHEST 2 VIEWS: CPT | Mod: TC

## 2022-12-02 PROCEDURE — 3074F PR MOST RECENT SYSTOLIC BLOOD PRESSURE < 130 MM HG: ICD-10-PCS | Mod: CPTII,S$GLB,, | Performed by: INTERNAL MEDICINE

## 2022-12-02 PROCEDURE — 99214 PR OFFICE/OUTPT VISIT, EST, LEVL IV, 30-39 MIN: ICD-10-PCS | Mod: S$GLB,,, | Performed by: INTERNAL MEDICINE

## 2022-12-02 PROCEDURE — 1101F PT FALLS ASSESS-DOCD LE1/YR: CPT | Mod: CPTII,S$GLB,, | Performed by: INTERNAL MEDICINE

## 2022-12-02 PROCEDURE — 3074F SYST BP LT 130 MM HG: CPT | Mod: CPTII,S$GLB,, | Performed by: INTERNAL MEDICINE

## 2022-12-02 PROCEDURE — 1159F PR MEDICATION LIST DOCUMENTED IN MEDICAL RECORD: ICD-10-PCS | Mod: CPTII,S$GLB,, | Performed by: INTERNAL MEDICINE

## 2022-12-02 PROCEDURE — 99999 PR PBB SHADOW E&M-EST. PATIENT-LVL IV: ICD-10-PCS | Mod: PBBFAC,,, | Performed by: INTERNAL MEDICINE

## 2022-12-02 PROCEDURE — 3288F FALL RISK ASSESSMENT DOCD: CPT | Mod: CPTII,S$GLB,, | Performed by: INTERNAL MEDICINE

## 2022-12-02 PROCEDURE — 71046 XR CHEST PA AND LATERAL: ICD-10-PCS | Mod: 26,,, | Performed by: RADIOLOGY

## 2022-12-02 PROCEDURE — 1101F PR PT FALLS ASSESS DOC 0-1 FALLS W/OUT INJ PAST YR: ICD-10-PCS | Mod: CPTII,S$GLB,, | Performed by: INTERNAL MEDICINE

## 2022-12-02 PROCEDURE — 99999 PR PBB SHADOW E&M-EST. PATIENT-LVL IV: CPT | Mod: PBBFAC,,, | Performed by: INTERNAL MEDICINE

## 2022-12-02 PROCEDURE — 3078F DIAST BP <80 MM HG: CPT | Mod: CPTII,S$GLB,, | Performed by: INTERNAL MEDICINE

## 2022-12-02 PROCEDURE — 1159F MED LIST DOCD IN RCRD: CPT | Mod: CPTII,S$GLB,, | Performed by: INTERNAL MEDICINE

## 2022-12-02 RX ORDER — FUROSEMIDE 20 MG/1
TABLET ORAL
COMMUNITY
Start: 2022-07-14

## 2022-12-02 NOTE — PROGRESS NOTES
Subjective:       Patient ID: Leanna Maddox is a 77 y.o. female.  Patient Active Problem List   Diagnosis    Essential hypertension    Hyperlipidemia    SONJA on CPAP    BMI 45.0-49.9, adult    Chronic wheezy bronchitis    Edema of leg    Restrictive ventilatory defect    Controlled REM sleep behavior disorder    Anxiety    Arthritis    Bilateral carotid artery stenosis    Idiopathic chronic gout of right foot without tophus    Exercise hypoxemia    Screening for respiratory organ cancer    Tortuous aorta    Class 3 severe obesity due to excess calories with serious comorbidity and body mass index (BMI) of 50.0 to 59.9 in adult     Immunization History   Administered Date(s) Administered    COVID-19, MRNA, LN-S, PF (Pfizer) (Gray Cap) 2022    COVID-19, MRNA, LN-S, PF (Pfizer) (Purple Cap) 2021, 2021    Influenza - High Dose - PF (65 years and older) 2012, 2014, 2015, 2018    Influenza - Quadrivalent - PF *Preferred* (6 months and older) 2020    Influenza - Trivalent - PF (ADULT) 10/15/2013    Pneumococcal Conjugate - 13 Valent 2018    Pneumococcal Polysaccharide - 23 Valent 2011, 2020    Tdap 2018     Social History     Tobacco Use   Smoking Status Former    Packs/day: 0.50    Years: 58.00    Pack years: 29.00    Types: Cigarettes    Quit date: 2018    Years since quittin.5   Smokeless Tobacco Never        EPWORTH SLEEPINESS SCALE 2022   Sitting and reading 1   Watching TV 1   Sitting, inactive in a public place (e.g. a theatre or a meeting) 0   As a passenger in a car for an hour without a break 0   Lying down to rest in the afternoon when circumstances permit 3   Sitting and talking to someone 0   Sitting quietly after a lunch without alcohol 1   In a car, while stopped for a few minutes in traffic 0   Total score 6         Chief Complaint:  Leanna MADDOX 77 y.o.  Last visit 2021  Here with Daughter:  Functional dyspnea  Charleen score was 4  No criteria for oxygen at night or with exercise  She is also treated for other comorbidities including obstructive sleep apnea on CPAP, REM behavioral disorder, gout,  Restrictive defect and Chronic wheezy bronchitis  Here to review: CXR   POC 2.0 LPM.  Quit smoking 3 years.  Added TRELEGY  No interval exacerbations  Discussed lower extremity edema adherence to stockings        The following portions of the patient's history were reviewed and updated as appropriate:   She  has a past medical history of CHF (congestive heart failure), Gout, and Hypertension.  She does not have any pertinent problems on file.  She  has no past surgical history on file.  Her family history is not on file.  She  reports that she quit smoking about 4 years ago. Her smoking use included cigarettes. She has a 29.00 pack-year smoking history. She has never used smokeless tobacco. She reports that she does not drink alcohol. No history on file for drug use.  She has a current medication list which includes the following prescription(s): albuterol, allopurinol, aspirin, atorvastatin, benazepril-hydrochlorthiazide, budesonide, fluticasone-umeclidin-vilanter, furosemide, melatonin, and triamcinolone acetonide 0.1%.  Current Outpatient Medications on File Prior to Visit   Medication Sig Dispense Refill    albuterol (ACCUNEB) 1.25 mg/3 mL Nebu Take 6 mLs (2.5 mg total) by nebulization 4 (four) times daily. 720 mL 11    allopurinol (ZYLOPRIM) 100 MG tablet Take 100 mg by mouth.      aspirin (ECOTRIN) 81 MG EC tablet Take 81 mg by mouth once daily.      atorvastatin (LIPITOR) 40 MG tablet Take 40 mg by mouth.      benazepril-hydrochlorthiazide (LOTENSIN HCT) 20-25 mg Tab       budesonide (PULMICORT) 0.5 mg/2 mL nebulizer solution INHALE THE CONTENTS OF 1 VIAL VIA NEBULIZER TWICE DAILY.WASH OUT MOUTH AFTER USE. 360 mL 11    furosemide (LASIX) 20 MG tablet       melatonin 3 mg Tab Take 1 tablet (3 mg total) by mouth every evening.  "90 tablet 3    triamcinolone acetonide 0.1% (KENALOG) 0.1 % cream APPLY TO AFFECTED AREA BID PRN       No current facility-administered medications on file prior to visit.     She is allergic to codeine..      Review of Systems   Constitutional:  Negative for malaise/fatigue.   HENT: Negative.     Eyes: Negative.    Respiratory:  Negative for cough, hemoptysis, sputum production, shortness of breath and wheezing.    Cardiovascular:  Positive for leg swelling.   Gastrointestinal: Negative.    Genitourinary: Negative.    Musculoskeletal: Negative.    Skin: Negative.    Neurological: Negative.    Endo/Heme/Allergies: Negative.    Psychiatric/Behavioral: Negative.        Objective:        Vitals:    12/02/22 0939   BP: 126/76   Pulse: 78   Resp: 17   SpO2: 95%  Comment: 2 LPM   Weight: 123 kg (271 lb 0.9 oz)   Height: 5' 2" (1.575 m)         Physical Exam  Vitals and nursing note reviewed.   Constitutional:       Appearance: She is well-developed.   HENT:      Head: Normocephalic and atraumatic.      Nose: Nose normal.      Mouth/Throat:      Pharynx: No oropharyngeal exudate.   Eyes:      Conjunctiva/sclera: Conjunctivae normal.      Pupils: Pupils are equal, round, and reactive to light.   Cardiovascular:      Rate and Rhythm: Normal rate and regular rhythm.      Heart sounds: Murmur heard.   Pulmonary:      Effort: Pulmonary effort is normal. No respiratory distress.      Breath sounds: No stridor. No wheezing, rhonchi or rales.   Chest:      Chest wall: No tenderness.   Abdominal:      General: Bowel sounds are normal.      Palpations: Abdomen is soft.   Musculoskeletal:         General: Normal range of motion.      Cervical back: Normal range of motion and neck supple.      Right lower leg: Edema present.      Left lower leg: Edema present.   Skin:     General: Skin is warm.      Capillary Refill: Capillary refill takes 2 to 3 seconds.   Neurological:      General: No focal deficit present.      Mental Status: She " "is alert and oriented to person, place, and time.         Data Review: CBC: No results found for: WBC, RBC, HGB, HCT, PLT  BMP:   Lab Results   Component Value Date    CREATININE 0.9 07/06/2019     Radiology review:    X-Ray Chest PA And Lateral  Narrative: EXAMINATION:  XR CHEST PA AND LATERAL    CLINICAL HISTORY:  Unspecified chronic bronchitis    TECHNIQUE:  PA and lateral views of the chest were performed.    COMPARISON:  08/05/2020    FINDINGS:  The lungs are clear, with normal appearance of pulmonary vasculature and no pleural effusion or pneumothorax.    The cardiac silhouette is normal in size. The hilar and mediastinal contours are unremarkable.    Bones are intact.  Impression: No acute abnormality.    Electronically signed by: Yvan Hammer MD  Date:    12/02/2022  Time:    09:12       Ordering Provider: MADELINE Dangelo NP             Interpreting Provider: Dr Franklin  Performing nurse/tech/RT: GUI RRT  Diagnosis:  (Restrictive ventilatory defect, hypoxemia)  Height: 5' 2" (157.5 cm)  Weight: 123.5 kg (272 lb 4.3 oz)  BMI (Calculated): 49.8              Patient Race:                                                                Phase Oxygen Assessment Supplemental O2 Heart   Rate Blood Pressure Charleen Dyspnea Scale Rating   Resting 97 % Room Air 65 bpm 136/76 2   Exercise             Minute             1 97 % Room Air 83 bpm       2 97 % Room Air 93 bpm       3 97 % Room Air 98 bpm       4 97 % Room Air 100 bpm       5 97 % Room Air 102 bpm       6  97 % Room Air 102 bpm 185/79 4   Recovery             Minute             1 98 % Room Air 83 bpm       2 99 % Room Air 76 bpm       3 99 % Room Air 76 bpm       4 99 % Room Air 75 bpm 169/74 2      Six Minute Walk Summary  6MWT Status: completed without stopping  Patient Reported: Dyspnea (chest tightness, hip and back pain)           Interpretation:  Did the patient stop or pause?: No  Total Time Walked (Calculated): 360 seconds  Final Partial Lap Distance " (feet): 50 feet  Total Distance Meters (Calculated): 259.08 meters  Predicted Distance Meters (Calculated): 277.23 meters  Percentage of Predicted (Calculated): 93.45  Peak VO2 (Calculated): 11.75  Mets: 3.36  Has The Patient Had a Previous Six Minute Walk Test?: Yes     Previous 6MWT Results  Has The Patient Had a Previous Six Minute Walk Test?: Yes  Date of Previous Test: 08/12/19  Total Time Walked: 360 seconds  Total Distance (meters): 304.8  Predicted Distance (meters): 298.23 meters  Percentage of Predicted: 102.2  Percent Change (Calculated): 0.15       Moderate restriction based on reduced Forced Vital Capacity (FVC). Consider lung volumes if clinically indicated.Flow volume loops demonstrate a restrictive defect  FEV1: 1.33L( 70.3%), FVC 1.63L( 66.5%)  FEV1/FVC 81    Assessment:      Problem List Items Addressed This Visit       Essential hypertension     Stable on LOTENSIN HCT         Hyperlipidemia      Stable on atorvastatin         SONJA on CPAP         DME: [employer].  May need machine checked if has gavi  On CPAP 8 cm   Full face mask   Improve adherence  Usage > 4 hrs was 50%, AHI 6.5  11/3/2021 overnight oximetry normal oxygenation, no indication for O2 with CPAP 8 cm.          BMI 45.0-49.9, adult     General weight loss/lifestyle modification strategies discussed (elicit support from others; identify saboteurs; non-food rewards).  Diet interventions: low calorie (1000 kCal/d) deficit diet          Chronic wheezy bronchitis - Primary     FEV1: 1.33L( 70.3%), FVC 1.63L( 66.5%)  Added TRELGY       Requested Prescriptions     Signed Prescriptions Disp Refills    fluticasone-umeclidin-vilanter (TRELEGY ELLIPTA) 100-62.5-25 mcg DsDv 60 each 11     Sig: Inhale 1 puff into the lungs once daily.               Relevant Medications    fluticasone-umeclidin-vilanter (TRELEGY ELLIPTA) 100-62.5-25 mcg DsDv    Other Relevant Orders    X-Ray Chest PA And Lateral    Spirometry with/without bronchodilator    Edema  of leg     Leg stocking  Diuretics         Restrictive ventilatory defect     FEV1: 1.33L( 70.3%) FVC : 1.63L ( 66.5%), FEV1/FVC 81  Related to body habitus  Weight loss  Added TRELEGY         Relevant Medications    fluticasone-umeclidin-vilanter (TRELEGY ELLIPTA) 100-62.5-25 mcg DsDv    Other Relevant Orders    X-Ray Chest PA And Lateral    Spirometry with/without bronchodilator    Controlled REM sleep behavior disorder     STABLE auto CPAP 6-16 cm with 2 L oxygen bled in  No guns and sharp objects in bedroom  Melatonin         Exercise hypoxemia     Port Oxygen 2 LPM  DME [facility]  No exercise indication for oxygen         Screening for respiratory organ cancer     Surveilance         Tortuous aorta      Seen on x-ray imaging follow-up with Cardiology           Plan:          No Criteria for O2 with exercise  Download CPAP  Added TRELEGY      Requested Prescriptions     Signed Prescriptions Disp Refills    fluticasone-umeclidin-vilanter (TRELEGY ELLIPTA) 100-62.5-25 mcg DsDv 60 each 11     Sig: Inhale 1 puff into the lungs once daily.     Orders Placed This Encounter   Procedures    X-Ray Chest PA And Lateral     Standing Status:   Future     Standing Expiration Date:   12/2/2023    Spirometry with/without bronchodilator     Standing Status:   Future     Standing Expiration Date:   12/2/2023     Order Specific Question:   Release to patient     Answer:   Immediate           Follow up in about 6 months (around 6/2/2023).    This note was prepared using voice recognition system and is likely to have sound alike errors that may have been overlooked even after proof reading.  Please call me with any questions    Discussed diagnosis, its evaluation, treatment and usual course. All questions answered.    Thank you for the courtesy of participating in the care of this patient    Uday Inman MD      MEDICAL DECISION MAKING: Moderate  complexity.  Overall, the multiple problems listed are of moderate to high  severity that may impact quality of life and activities of daily living. Side effects of medications, treatment plan as well as options and alternatives reviewed and discussed with patient. There was counseling of patient concerning these issues.    Total time spent in face to face counseling and coordination of care -  35minutes over 50% of time was used in discussion of prognosis, risks, benefits of treatment, instructions and compliance with regimen . Discussion with other physicians or health care providers occurred.    Uday Inman MD    TIME SPENT WITH PATIENT: Time spent: 35 minutes in face to face discussion concerning diagnosis, prognosis, review of lab and test results, benefits of treatment as well as management of disease, counseling of patient and coordination of care between various health care providers . Greater than half the time spent was used for coordination of care and counseling of patient.

## 2022-12-02 NOTE — ASSESSMENT & PLAN NOTE
DME: [employer].  May need machine checked if has gavi  On CPAP 8 cm   Full face mask   Improve adherence  Usage > 4 hrs was 50%, AHI 6.5  11/3/2021 overnight oximetry normal oxygenation, no indication for O2 with CPAP 8 cm.

## 2022-12-02 NOTE — ASSESSMENT & PLAN NOTE
FEV1: 1.33L( 70.3%) FVC : 1.63L ( 66.5%), FEV1/FVC 81  Related to body habitus  Weight loss  Added TRELEGY

## 2022-12-02 NOTE — ASSESSMENT & PLAN NOTE
FEV1: 1.33L( 70.3%), FVC 1.63L( 66.5%)  Added TRELGY       Requested Prescriptions     Signed Prescriptions Disp Refills    fluticasone-umeclidin-vilanter (TRELEGY ELLIPTA) 100-62.5-25 mcg DsDv 60 each 11     Sig: Inhale 1 puff into the lungs once daily.

## 2022-12-07 ENCOUNTER — TELEPHONE (OUTPATIENT)
Dept: PULMONOLOGY | Facility: CLINIC | Age: 77
End: 2022-12-07
Payer: MEDICARE

## 2022-12-07 DIAGNOSIS — G47.33 OSA ON CPAP: Primary | ICD-10-CM

## 2022-12-07 NOTE — TELEPHONE ENCOUNTER
Orders Placed This Encounter   Procedures    CPAP/BIPAP SUPPLIES     Order Specific Question:   Length of need (1-99 months):     Answer:   99     Order Specific Question:   Choose ONE mask type and its corresponding cushions and/or pillows:     Answer:    Full Face Mask, 1 per 90 days:  Full Face Cushion, (3 per 90 days)     Order Specific Question:   Choose EITHER Heated or Non-Heated Tubjing     Answer:    Heated Tubing, 1 per 6 months     Order Specific Question:   All other supplies as needed as listed below:     Answer:    Headgear, 1 per 180 days     Order Specific Question:   All other supplies as needed as listed below:     Answer:    Chin Strap, 1 per 180 days     Order Specific Question:   All other supplies as needed as listed below:     Answer:    Disposable Filter, 6 per 90 days     Order Specific Question:   All other supplies as needed as listed below:     Answer:    Non-Disposable Filter, 1 per 180 days     Order Specific Question:   All other supplies as needed as listed below:     Answer:    Humidifier Chamber, 1 per 180 days

## 2022-12-07 NOTE — TELEPHONE ENCOUNTER
----- Message from Lala Black sent at 12/7/2022  9:21 AM CST -----  Contact: Leanna Ram needs to get a new max and supplies and the she needs a order and with diagnose on it fax to [phone] and or called them at [phone] for her Cpap supplies. Please call her at [phone].    Thanks  Td

## 2023-04-04 ENCOUNTER — TELEPHONE (OUTPATIENT)
Dept: PULMONOLOGY | Facility: CLINIC | Age: 78
End: 2023-04-04
Payer: MEDICARE

## 2023-04-11 DIAGNOSIS — J42 CHRONIC WHEEZY BRONCHITIS: ICD-10-CM

## 2023-04-11 DIAGNOSIS — R94.2 RESTRICTIVE VENTILATORY DEFECT: ICD-10-CM

## 2023-04-11 RX ORDER — ALBUTEROL SULFATE 0.83 MG/ML
2.5 SOLUTION RESPIRATORY (INHALATION) EVERY 6 HOURS PRN
Qty: 360 ML | Refills: 11 | Status: SHIPPED | OUTPATIENT
Start: 2023-04-11 | End: 2024-04-10

## 2023-05-04 ENCOUNTER — TELEPHONE (OUTPATIENT)
Dept: PULMONOLOGY | Facility: CLINIC | Age: 78
End: 2023-05-04
Payer: MEDICARE

## 2023-05-04 DIAGNOSIS — G47.33 OSA ON CPAP: ICD-10-CM

## 2023-05-04 DIAGNOSIS — J42 CHRONIC WHEEZY BRONCHITIS: Primary | ICD-10-CM

## 2023-05-04 NOTE — TELEPHONE ENCOUNTER
Orders Placed This Encounter   Procedures    NEBULIZER KIT (SUPPLIES) FOR HOME USE     Order Specific Question:   Height:     Answer:   5 2     Order Specific Question:   Weight:     Answer:   271lbs     Order Specific Question:   Length of need (1-99 months):     Answer:   99     Order Specific Question:   Mask or Mouthpiece?     Answer:   Mouthpiece    CPAP/BIPAP SUPPLIES     Order Specific Question:   Length of need (1-99 months):     Answer:   99     Order Specific Question:   Choose ONE mask type and its corresponding cushions and/or pillows:     Answer:    Full Face Mask, 1 per 90 days:  Full Face Cushion, (3 per 90 days)     Order Specific Question:   Choose EITHER Heated or Non-Heated Tubjing     Answer:    Non-Heated Tubing, 1 per 90 days     Order Specific Question:   All other supplies as needed as listed below:     Answer:    Headgear, 1 per 180 days     Order Specific Question:   All other supplies as needed as listed below:     Answer:    Chin Strap, 1 per 180 days     Order Specific Question:   All other supplies as needed as listed below:     Answer:    Non-Disposable Filter, 1 per 180 days     Order Specific Question:   All other supplies as needed as listed below:     Answer:    Disposable Filter, 6 per 90 days     Order Specific Question:   All other supplies as needed as listed below:     Answer:    Exhalation Port, contact payer for quantity/frequency     Order Specific Question:   All other supplies as needed as listed below:     Answer:    Humidifier Chamber, 1 per 180 days       Linear Impaired reasoning/Disorganized/Illogical/Thought blocking

## 2023-05-04 NOTE — TELEPHONE ENCOUNTER
----- Message from Molly Yuan sent at 2023  9:17 AM CDT -----  Regarding: CPAP and Neb  Good morning. Pt is requesting neb supplies. The order has . Can you please update the order? Also, she is switching to us for cpap supplies. We need a copy of the sleep study. I do not see it in [facility] but pt said Dr. Inman has a copy of it. Can you please let me know if you do have it? Thanks.

## 2023-05-15 ENCOUNTER — TELEPHONE (OUTPATIENT)
Dept: PULMONOLOGY | Facility: CLINIC | Age: 78
End: 2023-05-15
Payer: MEDICARE

## 2023-05-15 NOTE — TELEPHONE ENCOUNTER
----- Message from Keyon Figueroa sent at 5/15/2023 10:50 AM CDT -----  Contact: [phone]  Pt is requesting a call in regards to the status of her order for the  cpap machine. Please call pt back at [phone]. Thanks KB

## 2023-07-07 ENCOUNTER — PATIENT MESSAGE (OUTPATIENT)
Dept: INFECTIOUS DISEASES | Facility: CLINIC | Age: 78
End: 2023-07-07
Payer: MEDICARE

## 2023-11-07 DIAGNOSIS — R94.2 RESTRICTIVE VENTILATORY DEFECT: ICD-10-CM

## 2023-11-07 DIAGNOSIS — J42 CHRONIC WHEEZY BRONCHITIS: ICD-10-CM

## 2023-11-08 RX ORDER — BUDESONIDE 0.5 MG/2ML
INHALANT ORAL
Qty: 360 ML | Refills: 0 | Status: SHIPPED | OUTPATIENT
Start: 2023-11-08 | End: 2024-01-10

## 2024-01-10 ENCOUNTER — HOSPITAL ENCOUNTER (OUTPATIENT)
Dept: RADIOLOGY | Facility: HOSPITAL | Age: 79
Discharge: HOME | End: 2024-01-10
Attending: INTERNAL MEDICINE
Payer: MEDICARE

## 2024-01-10 ENCOUNTER — OFFICE VISIT (OUTPATIENT)
Dept: PULMONOLOGY | Facility: CLINIC | Age: 79
End: 2024-01-10
Payer: MEDICARE

## 2024-01-10 ENCOUNTER — CLINICAL SUPPORT (OUTPATIENT)
Dept: PULMONOLOGY | Facility: CLINIC | Age: 79
End: 2024-01-10
Attending: INTERNAL MEDICINE
Payer: MEDICARE

## 2024-01-10 VITALS
WEIGHT: 267.88 LBS | DIASTOLIC BLOOD PRESSURE: 80 MMHG | RESPIRATION RATE: 18 BRPM | HEART RATE: 74 BPM | HEIGHT: 62 IN | SYSTOLIC BLOOD PRESSURE: 122 MMHG | BODY MASS INDEX: 49.3 KG/M2 | OXYGEN SATURATION: 94 %

## 2024-01-10 DIAGNOSIS — J42 CHRONIC WHEEZY BRONCHITIS: ICD-10-CM

## 2024-01-10 DIAGNOSIS — R09.02 EXERCISE HYPOXEMIA: ICD-10-CM

## 2024-01-10 DIAGNOSIS — E78.2 MIXED HYPERLIPIDEMIA: ICD-10-CM

## 2024-01-10 DIAGNOSIS — R94.2 RESTRICTIVE VENTILATORY DEFECT: ICD-10-CM

## 2024-01-10 DIAGNOSIS — R94.2 RESTRICTIVE VENTILATORY DEFECT: Primary | ICD-10-CM

## 2024-01-10 DIAGNOSIS — G47.52 CONTROLLED REM SLEEP BEHAVIOR DISORDER: ICD-10-CM

## 2024-01-10 DIAGNOSIS — G47.33 OSA ON CPAP: ICD-10-CM

## 2024-01-10 DIAGNOSIS — I10 ESSENTIAL HYPERTENSION: ICD-10-CM

## 2024-01-10 PROCEDURE — 95012 NITRIC OXIDE EXP GAS DETER: CPT | Mod: S$GLB,,, | Performed by: INTERNAL MEDICINE

## 2024-01-10 PROCEDURE — 99999 PR PBB SHADOW E&M-EST. PATIENT-LVL IV: CPT | Mod: PBBFAC,,, | Performed by: INTERNAL MEDICINE

## 2024-01-10 PROCEDURE — 1101F PT FALLS ASSESS-DOCD LE1/YR: CPT | Mod: CPTII,S$GLB,, | Performed by: INTERNAL MEDICINE

## 2024-01-10 PROCEDURE — 1159F MED LIST DOCD IN RCRD: CPT | Mod: CPTII,S$GLB,, | Performed by: INTERNAL MEDICINE

## 2024-01-10 PROCEDURE — 71046 X-RAY EXAM CHEST 2 VIEWS: CPT | Mod: 26,,, | Performed by: RADIOLOGY

## 2024-01-10 PROCEDURE — 71046 X-RAY EXAM CHEST 2 VIEWS: CPT | Mod: TC

## 2024-01-10 PROCEDURE — 3079F DIAST BP 80-89 MM HG: CPT | Mod: CPTII,S$GLB,, | Performed by: INTERNAL MEDICINE

## 2024-01-10 PROCEDURE — 1160F RVW MEDS BY RX/DR IN RCRD: CPT | Mod: CPTII,S$GLB,, | Performed by: INTERNAL MEDICINE

## 2024-01-10 PROCEDURE — 99214 OFFICE O/P EST MOD 30 MIN: CPT | Mod: 25,S$GLB,, | Performed by: INTERNAL MEDICINE

## 2024-01-10 PROCEDURE — 3288F FALL RISK ASSESSMENT DOCD: CPT | Mod: CPTII,S$GLB,, | Performed by: INTERNAL MEDICINE

## 2024-01-10 PROCEDURE — 3074F SYST BP LT 130 MM HG: CPT | Mod: CPTII,S$GLB,, | Performed by: INTERNAL MEDICINE

## 2024-01-10 RX ORDER — AMLODIPINE BESYLATE 2.5 MG/1
2.5 TABLET ORAL DAILY
COMMUNITY

## 2024-01-10 NOTE — PROGRESS NOTES
Subjective:       Patient ID: Leanna Maddox is a 78 y.o. female.  Patient Active Problem List   Diagnosis    Essential hypertension    Hyperlipidemia    SONJA on CPAP    BMI 45.0-49.9, adult    Chronic wheezy bronchitis    Edema of leg    Restrictive ventilatory defect    Controlled REM sleep behavior disorder    Anxiety    Arthritis    Bilateral carotid artery stenosis    Idiopathic chronic gout of right foot without tophus    Screening for respiratory organ cancer    Tortuous aorta    Class 3 severe obesity due to excess calories with serious comorbidity and body mass index (BMI) of 50.0 to 59.9 in adult     Immunization History   Administered Date(s) Administered    COVID-19, MRNA, LN-S, PF (Pfizer) (Gray Cap) 2022    COVID-19, MRNA, LN-S, PF (Pfizer) (Purple Cap) 2021, 2021    Influenza - High Dose - PF (65 years and older) 2012, 2014, 2015, 2018    Influenza - Quadrivalent - PF *Preferred* (6 months and older) 2020    Influenza - Trivalent - PF (ADULT) 10/15/2013    Pneumococcal Conjugate - 13 Valent 2018    Pneumococcal Polysaccharide - 23 Valent 2011, 2020    Tdap 2018     Social History     Tobacco Use   Smoking Status Former    Current packs/day: 0.00    Average packs/day: 0.5 packs/day for 58.0 years (29.0 ttl pk-yrs)    Types: Cigarettes    Start date: 1960    Quit date: 2018    Years since quittin.6   Smokeless Tobacco Never            Chief Complaint:  Leanna MADDOX 78 y.o.  Last visit 2021  Here with Daughter:  Functional dyspnea Charleen score was 4  No criteria for oxygen at night or with exercise  She is also treated for other comorbidities including obstructive sleep apnea on CPAP, REM behavioral disorder, gout,  Restrictive defect and Chronic wheezy bronchitis  Here to review: CXR   POC 2.0 LPM.  Quit smoking 3 years.  Added TRELEGY  No interval exacerbations  Discussed lower extremity edema adherence to  stockings      01/10/2024  Followup  Stable on TRELEGY  No cough, No wheezing, No SOB  No recent exacebations  ACT score  [place]  Leg edema resolved  CPAP supplies from O2 solution  Has oxygen Not using  No nightmares  Bed time 9 pm to 1 am  Wake time 7 am             1/10/2024     9:30 AM   EPWORTH SLEEPINESS SCALE   Sitting and reading 1   Watching TV 1   Sitting, inactive in a public place (e.g. a theatre or a meeting) 0   As a passenger in a car for an hour without a break 0   Lying down to rest in the afternoon when circumstances permit 1   Sitting and talking to someone 1   Sitting quietly after a lunch without alcohol 1   In a car, while stopped for a few minutes in traffic 0   Total score 5        The following portions of the patient's history were reviewed and updated as appropriate:   She  has a past medical history of CHF (congestive heart failure), Gout, and Hypertension.  She does not have any pertinent problems on file.  She  has no past surgical history on file.  Her family history is not on file.  She  reports that she quit smoking about 5 years ago. Her smoking use included cigarettes. She started smoking about 63 years ago. She has a 29.0 pack-year smoking history. She has never used smokeless tobacco. She reports that she does not drink alcohol. No history on file for drug use.  She has a current medication list which includes the following prescription(s): albuterol, allopurinol, amlodipine, aspirin, atorvastatin, benazepril-hydrochlorthiazide, fluticasone-umeclidin-vilanter, furosemide, melatonin, and triamcinolone acetonide 0.1%.  Current Outpatient Medications on File Prior to Visit   Medication Sig Dispense Refill    albuterol (PROVENTIL) 2.5 mg /3 mL (0.083 %) nebulizer solution Take 3 mLs (2.5 mg total) by nebulization every 6 (six) hours as needed for Wheezing. Rescue 360 mL 11    allopurinol (ZYLOPRIM) 100 MG tablet Take 100 mg by mouth.      amLODIPine (NORVASC) 2.5 MG tablet Take 2.5  "mg by mouth once daily.      aspirin (ECOTRIN) 81 MG EC tablet Take 81 mg by mouth once daily.      atorvastatin (LIPITOR) 40 MG tablet Take 40 mg by mouth.      benazepril-hydrochlorthiazide (LOTENSIN HCT) 20-25 mg Tab       furosemide (LASIX) 20 MG tablet       melatonin 3 mg Tab Take 1 tablet (3 mg total) by mouth every evening. 90 tablet 3    triamcinolone acetonide 0.1% (KENALOG) 0.1 % cream APPLY TO AFFECTED AREA BID PRN      [DISCONTINUED] budesonide (PULMICORT) 0.5 mg/2 mL nebulizer solution INHALE THE CONTENTS OF 1 VIAL VIA NEBULIZER TWICE DAILY. WASH OUT MOUTH AFTER USE. 360 mL 0     No current facility-administered medications on file prior to visit.     She is allergic to codeine..      Review of Systems   Constitutional:  Negative for malaise/fatigue.   HENT: Negative.     Eyes: Negative.    Respiratory:  Negative for cough, hemoptysis, sputum production, shortness of breath and wheezing.    Cardiovascular:  Positive for leg swelling.   Gastrointestinal: Negative.    Genitourinary: Negative.    Musculoskeletal: Negative.    Skin: Negative.    Neurological: Negative.    Endo/Heme/Allergies: Negative.    Psychiatric/Behavioral: Negative.          Objective:        Vitals:    01/10/24 0906   BP: 122/80   Pulse: 74   Resp: 18   SpO2: (!) 94%   Weight: 121.5 kg (267 lb 13.7 oz)   Height: 5' 2" (1.575 m)           Physical Exam  Vitals and nursing note reviewed.   Constitutional:       Appearance: She is well-developed.   HENT:      Head: Normocephalic and atraumatic.      Nose: Nose normal.      Mouth/Throat:      Pharynx: No oropharyngeal exudate.   Eyes:      Conjunctiva/sclera: Conjunctivae normal.      Pupils: Pupils are equal, round, and reactive to light.   Cardiovascular:      Rate and Rhythm: Normal rate and regular rhythm.      Heart sounds: Murmur heard.   Pulmonary:      Effort: Pulmonary effort is normal. No respiratory distress.      Breath sounds: No stridor. No wheezing, rhonchi or rales. "   Chest:      Chest wall: No tenderness.   Abdominal:      General: Bowel sounds are normal.      Palpations: Abdomen is soft.   Musculoskeletal:         General: Normal range of motion.      Cervical back: Normal range of motion and neck supple.      Right lower leg: No edema.      Left lower leg: No edema.   Skin:     General: Skin is warm.      Capillary Refill: Capillary refill takes 2 to 3 seconds.   Neurological:      General: No focal deficit present.      Mental Status: She is alert and oriented to person, place, and time.           Data Review: CBC:   Lab Results   Component Value Date    WBC 9.6 08/03/2023    HGB 13.6 08/03/2023    HCT 42.8 08/03/2023     08/03/2023     BMP:   Lab Results   Component Value Date    CREATININE 0.9 07/06/2019     Radiology review:    X-Ray Chest PA And Lateral  Narrative: EXAM: XR CHEST PA AND LATERAL    CLINICAL HISTORY:  Abnormal pulmonary function test.    TECHNIQUE: 2 view chest x-ray.    COMPARISON: 12/02/2022.    FINDINGS: The heart size is normal. The lung fields are clear.  Arthritic changes thoracic spine.  Mild aortic atherosclerosis.  Impression:  No acute findings.    Finalized on: 1/10/2024 10:50 AM By:  Yvan Flanagan MD  [facility]# [id number]      2024-01-10 10:52:[id number]    [facility]       Clinical Guide to Interpretation or FeNO Levels :     FeNO  (ppb) LOW INTERMEDIATE HIGH   ADULT VALUES < 25 25-50          > 50   Th2-driven Inflammation Unlikely Likely Significant      Patients FeNO level at this visit : _19___ (ppb)     Interpretation of FeNO measurement in adults:  [x] FENO is less than 25 ppb implies non eosinophilic airway inflammation or the absence of airway inflammation.               Comment: Low FENO (<25 ppb) in adult asthmatics with persistent symptoms suggests other etiologies for these symptoms and a lower likelihood of benefit from adding or increasing inhaled glucocorticoids.    Assessment:      Problem List Items Addressed This Visit       BMI  45.0-49.9, adult    Essential hypertension    Hyperlipidemia    SONJA on CPAP    Relevant Orders    CPAP/BIPAP SUPPLIES    Chronic wheezy bronchitis     Stable on TRELEGY  Will get RSV         Relevant Medications    fluticasone-umeclidin-vilanter (TRELEGY ELLIPTA) 100-62.5-25 mcg DsDv    Restrictive ventilatory defect - Primary     FeNo was low 19 PPB  Related to body habitus  Weight loss  Stable onTRELEGY         Relevant Medications    fluticasone-umeclidin-vilanter (TRELEGY ELLIPTA) 100-62.5-25 mcg DsDv    Other Relevant Orders    RSVPreF Recombinant (Arexvy)    X-Ray Chest PA And Lateral (Completed)    Fraction of  Nitric Oxide (Completed)    Spirometry without Bronchodilator    Controlled REM sleep behavior disorder     Currently stable no occurence         RESOLVED: Exercise hypoxemia      Plan:          Continue TRELEGY  Adherence with CPAP      Requested Prescriptions     Signed Prescriptions Disp Refills    fluticasone-umeclidin-vilanter (TRELEGY ELLIPTA) 100-62.5-25 mcg DsDv 60 each 5     Sig: Inhale 1 puff into the lungs once daily.     Orders Placed This Encounter   Procedures    CPAP/BIPAP SUPPLIES     Order Specific Question:   Length of need (1-99 months):     Answer:   99     Order Specific Question:   Choose ONE mask type and its corresponding cushions and/or pillows:     Answer:    Full Face Mask, 1 per 90 days:  Full Face Cushion, (3 per 90 days)     Order Specific Question:   Choose EITHER Heated or Non-Heated Tubjing     Answer:    Non-Heated Tubing, 1 per 90 days     Order Specific Question:   All other supplies as needed as listed below:     Answer:    Headgear, 1 per 180 days     Order Specific Question:   All other supplies as needed as listed below:     Answer:    Disposable Filter, 6 per 90 days     Order Specific Question:   All other supplies as needed as listed below:     Answer:    Non-Disposable Filter, 1 per 180 days     Order Specific Question:    All other supplies as needed as listed below:     Answer:    Exhalation Port, contact payer for quantity/frequency     Order Specific Question:   All other supplies as needed as listed below:     Answer:    Humidifier Chamber, 1 per 180 days    X-Ray Chest PA And Lateral     Standing Status:   Future     Number of Occurrences:   1     Standing Expiration Date:   2025    RSVPreF Recombinant (Arexvy)    Fraction of  Nitric Oxide     Standing Status:   Future     Number of Occurrences:   1     Standing Expiration Date:   1/10/2025     Order Specific Question:   Release to patient     Answer:   Immediate    Spirometry without Bronchodilator     Standing Status:   Future     Standing Expiration Date:   2025     Order Specific Question:   Release to patient     Answer:   Immediate           Follow up in about 1 year (around 1/10/2025), or epworth and asthma score, download CPAP, needs RSV, feno today, cxr today, for david next, CPAP supplies.    This note was prepared using voice recognition system and is likely to have sound alike errors that may have been overlooked even after proof reading.  Please call me with any questions    Discussed diagnosis, its evaluation, treatment and usual course. All questions answered.    Thank you for the courtesy of participating in the care of this patient    Uday Inman MD      MEDICAL DECISION MAKING: Moderate  complexity.  Overall, the multiple problems listed are of moderate to high severity that may impact quality of life and activities of daily living. Side effects of medications, treatment plan as well as options and alternatives reviewed and discussed with patient. There was counseling of patient concerning these issues.    Total time spent in face to face counseling and coordination of care -  35minutes over 50% of time was used in discussion of prognosis, risks, benefits of treatment, instructions and compliance with regimen . Discussion with  other physicians or health care providers occurred.    Uday Inman MD    TIME SPENT WITH PATIENT: Time spent: 35 minutes in face to face discussion concerning diagnosis, prognosis, review of lab and test results, benefits of treatment as well as management of disease, counseling of patient and coordination of care between various health care providers . Greater than half the time spent was used for coordination of care and counseling of patient.

## 2024-01-10 NOTE — PROCEDURES
Clinical Guide to Interpretation or FeNO Levels :    FeNO  (ppb) LOW INTERMEDIATE HIGH   ADULT VALUES < 25 25-50          > 50   Th2-driven Inflammation Unlikely Likely Significant     Patients FeNO level at this visit : _19___ (ppb)    Interpretation of FeNO measurement in adults:  [x] FENO is less than 25 ppb implies non eosinophilic airway inflammation or the absence of airway inflammation.    Comment: Low FENO (<25 ppb) in adult asthmatics with persistent symptoms suggests other etiologies for these symptoms and a lower likelihood of benefit from adding or increasing inhaled glucocorticoids.    [] FENO between 25 and 50 ppb in adults should be interpreted cautiously with reference to the clinical situation (eg, symptomatic, on or off therapy, current smoking).    [] FENO greater than 50 ppb in adults  suggests eosinophilic airway inflammation   Comment: High FENO (>50 ppb) in adult asthmatics even with atypical symptoms suggests glucocorticoid responsiveness. High FENO (>50 ppb) can help identify poor adherence or uncontrolled inflammation in asthma patients with otherwise seemingly controlled asthma.    Discussion:  A FENO less than 25 ppb in adults and less than 20 ppb in children younger than 12 years of age implies noneosinophilic airway inflammation or the absence of airway inflammation.  A FENO greater than 50 ppb in adults or greater than 35 ppb in children suggests eosinophilic airway inflammation.   Values of FENO between 25 and 50 ppb in adults (20 to 35 ppb in children) should be interpreted cautiously with reference to the clinical situation (eg, symptomatic, on or off therapy, current smoking).  A rising FENO with a greater than 20 percent change and more than 25 ppb (20 ppb in children) from a previously stable level suggests increasing eosinophilic airway inflammation, but there are wide inter-individual differences.  A decrease in FENO greater than 20 percent for values over 50 ppb or more than  10 ppb for values less than 50 ppb may be clinically important.  ?FENO in other respiratory diseases - Several other diseases are associated with altered levels of exhaled NO: low levels of FENO have been noted in cystic fibrosis, current smoking, pulmonary hypertension, hypothermia, primary ciliary dyskinesia, and bronchopulmonary dysplasia. Elevated FENO has been noted in atopy, nonasthmatic eosinophilic bronchitis, COPD exacerbations, noncystic fibrosis bronchiectasis, and viral upper respiratory infections.    REFERENCE:  ATS Board of Directors, December 2004, and by the ERS Executive Committee, June 2004. ATS/ERS Recommendations for Standardized Procedures for the Online and Offline Measurement of Exhaled Lower Respiratory Nitric Oxide and Nasal Nitric Oxide. Guideline 2005

## 2024-02-22 DIAGNOSIS — J42 CHRONIC WHEEZY BRONCHITIS: ICD-10-CM

## 2024-02-22 DIAGNOSIS — R94.2 RESTRICTIVE VENTILATORY DEFECT: ICD-10-CM

## 2024-02-23 RX ORDER — FLUTICASONE FUROATE, UMECLIDINIUM BROMIDE AND VILANTEROL TRIFENATATE 100; 62.5; 25 UG/1; UG/1; UG/1
POWDER RESPIRATORY (INHALATION)
Qty: 60 EACH | Refills: 3 | Status: SHIPPED | OUTPATIENT
Start: 2024-02-23

## 2024-04-03 ENCOUNTER — PATIENT MESSAGE (OUTPATIENT)
Dept: PULMONOLOGY | Facility: CLINIC | Age: 79
End: 2024-04-03
Payer: MEDICARE

## 2024-07-31 ENCOUNTER — PATIENT MESSAGE (OUTPATIENT)
Dept: RESEARCH | Facility: HOSPITAL | Age: 79
End: 2024-07-31
Payer: MEDICARE

## 2024-08-06 DIAGNOSIS — R94.2 RESTRICTIVE VENTILATORY DEFECT: ICD-10-CM

## 2024-08-06 DIAGNOSIS — J42 CHRONIC WHEEZY BRONCHITIS: ICD-10-CM

## 2024-08-07 RX ORDER — FLUTICASONE FUROATE, UMECLIDINIUM BROMIDE AND VILANTEROL TRIFENATATE 100; 62.5; 25 UG/1; UG/1; UG/1
1 POWDER RESPIRATORY (INHALATION) DAILY
Qty: 60 EACH | Refills: 3 | Status: SHIPPED | OUTPATIENT
Start: 2024-08-07

## 2024-11-26 ENCOUNTER — TELEPHONE (OUTPATIENT)
Dept: PULMONOLOGY | Facility: CLINIC | Age: 79
End: 2024-11-26
Payer: MEDICARE

## 2024-11-26 DIAGNOSIS — G47.33 OSA (OBSTRUCTIVE SLEEP APNEA): Primary | ICD-10-CM

## 2024-11-26 DIAGNOSIS — J42 CHRONIC WHEEZY BRONCHITIS: ICD-10-CM

## 2024-11-26 NOTE — TELEPHONE ENCOUNTER
Returned call back. Daughter stated mom needed supplies for cpap and nebulizer. Staff put order in for patient----- Message from Jennifer sent at 11/26/2024  1:01 PM CST -----  Contact: Jannie [phone]  Type:  RX Refill Request    Who Called: Jannie  Refill or New Rx:REFILL  RX Name and Strength:NEBULIZER  SUPPLIES  How is the patient currently taking it? (ex. 1XDay)  Is this a 30 day or 90 day RX  Preferred Pharmacy with phone number.  Local or Mail Order:[facility]  Ordering Provider:GARO  Would the patient rather a call back or a response via [facility]?   Best Call Back Number:[phone]  Additional Information:

## 2024-12-27 ENCOUNTER — PATIENT MESSAGE (OUTPATIENT)
Dept: PULMONOLOGY | Facility: CLINIC | Age: 79
End: 2024-12-27
Payer: MEDICARE

## 2024-12-27 DIAGNOSIS — R94.2 RESTRICTIVE VENTILATORY DEFECT: ICD-10-CM

## 2024-12-27 DIAGNOSIS — J42 CHRONIC WHEEZY BRONCHITIS: ICD-10-CM

## 2024-12-27 RX ORDER — BUDESONIDE 0.5 MG/2ML
INHALANT ORAL
Qty: 360 ML | Refills: 3 | OUTPATIENT
Start: 2024-12-27

## 2024-12-27 RX ORDER — ALBUTEROL SULFATE 0.83 MG/ML
2.5 SOLUTION RESPIRATORY (INHALATION) EVERY 6 HOURS PRN
Qty: 360 ML | Refills: 11 | Status: SHIPPED | OUTPATIENT
Start: 2024-12-27 | End: 2025-12-27

## 2024-12-27 RX ORDER — FLUTICASONE FUROATE, UMECLIDINIUM BROMIDE AND VILANTEROL TRIFENATATE 100; 62.5; 25 UG/1; UG/1; UG/1
1 POWDER RESPIRATORY (INHALATION) DAILY
Qty: 60 EACH | Refills: 3 | Status: SHIPPED | OUTPATIENT
Start: 2024-12-27

## 2025-02-11 ENCOUNTER — OFFICE VISIT (OUTPATIENT)
Dept: PULMONOLOGY | Facility: CLINIC | Age: 80
End: 2025-02-11
Payer: MEDICARE

## 2025-02-11 ENCOUNTER — HOSPITAL ENCOUNTER (OUTPATIENT)
Dept: RADIOLOGY | Facility: HOSPITAL | Age: 80
Discharge: HOME | End: 2025-02-11
Attending: INTERNAL MEDICINE
Payer: MEDICARE

## 2025-02-11 ENCOUNTER — CLINICAL SUPPORT (OUTPATIENT)
Dept: PULMONOLOGY | Facility: CLINIC | Age: 80
End: 2025-02-11
Payer: MEDICARE

## 2025-02-11 VITALS
RESPIRATION RATE: 18 BRPM | WEIGHT: 263.88 LBS | OXYGEN SATURATION: 99 % | HEART RATE: 88 BPM | DIASTOLIC BLOOD PRESSURE: 70 MMHG | HEIGHT: 63 IN | SYSTOLIC BLOOD PRESSURE: 144 MMHG | BODY MASS INDEX: 46.75 KG/M2

## 2025-02-11 DIAGNOSIS — G47.52 CONTROLLED REM SLEEP BEHAVIOR DISORDER: ICD-10-CM

## 2025-02-11 DIAGNOSIS — I10 ESSENTIAL HYPERTENSION: ICD-10-CM

## 2025-02-11 DIAGNOSIS — R94.2 RESTRICTIVE VENTILATORY DEFECT: ICD-10-CM

## 2025-02-11 DIAGNOSIS — G47.33 OSA ON CPAP: ICD-10-CM

## 2025-02-11 DIAGNOSIS — Z23 NEED FOR VACCINATION: ICD-10-CM

## 2025-02-11 DIAGNOSIS — J42 CHRONIC WHEEZY BRONCHITIS: ICD-10-CM

## 2025-02-11 DIAGNOSIS — E78.2 MIXED HYPERLIPIDEMIA: ICD-10-CM

## 2025-02-11 DIAGNOSIS — J42 CHRONIC WHEEZY BRONCHITIS: Primary | ICD-10-CM

## 2025-02-11 PROBLEM — E66.01 CLASS 3 SEVERE OBESITY DUE TO EXCESS CALORIES WITH SERIOUS COMORBIDITY AND BODY MASS INDEX (BMI) OF 50.0 TO 59.9 IN ADULT: Status: RESOLVED | Noted: 2021-11-02 | Resolved: 2025-02-11

## 2025-02-11 PROBLEM — E66.813 CLASS 3 SEVERE OBESITY DUE TO EXCESS CALORIES WITH SERIOUS COMORBIDITY AND BODY MASS INDEX (BMI) OF 50.0 TO 59.9 IN ADULT: Status: RESOLVED | Noted: 2021-11-02 | Resolved: 2025-02-11

## 2025-02-11 PROCEDURE — 71046 X-RAY EXAM CHEST 2 VIEWS: CPT | Mod: 26,,, | Performed by: STUDENT IN AN ORGANIZED HEALTH CARE EDUCATION/TRAINING PROGRAM

## 2025-02-11 PROCEDURE — 71046 X-RAY EXAM CHEST 2 VIEWS: CPT | Mod: TC

## 2025-02-11 PROCEDURE — 99999 PR PBB SHADOW E&M-EST. PATIENT-LVL I: CPT | Mod: PBBFAC,,,

## 2025-02-11 PROCEDURE — 99999 PR PBB SHADOW E&M-EST. PATIENT-LVL V: CPT | Mod: PBBFAC,,, | Performed by: INTERNAL MEDICINE

## 2025-02-11 RX ORDER — LANOLIN ALCOHOL/MO/W.PET/CERES
1000 CREAM (GRAM) TOPICAL
COMMUNITY

## 2025-02-11 RX ORDER — DOXYCYCLINE HYCLATE 100 MG
100 TABLET ORAL 2 TIMES DAILY PRN
COMMUNITY
Start: 2025-01-16

## 2025-02-11 RX ORDER — FLUTICASONE FUROATE, UMECLIDINIUM BROMIDE AND VILANTEROL TRIFENATATE 100; 62.5; 25 UG/1; UG/1; UG/1
1 POWDER RESPIRATORY (INHALATION) DAILY
Qty: 60 EACH | Refills: 5 | Status: SHIPPED | OUTPATIENT
Start: 2025-02-11 | End: 2026-02-11

## 2025-02-11 RX ORDER — ALBUTEROL SULFATE 0.83 MG/ML
2.5 SOLUTION RESPIRATORY (INHALATION) EVERY 6 HOURS PRN
Qty: 360 ML | Refills: 11 | Status: SHIPPED | OUTPATIENT
Start: 2025-02-11 | End: 2026-02-11

## 2025-02-11 RX ORDER — ALBUTEROL SULFATE 90 UG/1
2 INHALANT RESPIRATORY (INHALATION) EVERY 4 HOURS PRN
Qty: 18 G | Refills: 11 | Status: SHIPPED | OUTPATIENT
Start: 2025-02-11 | End: 2026-02-11

## 2025-02-11 RX ORDER — TALC
3 POWDER (GRAM) TOPICAL NIGHTLY
Qty: 90 TABLET | Refills: 3 | Status: SHIPPED | OUTPATIENT
Start: 2025-02-11 | End: 2026-02-11

## 2025-02-11 RX ORDER — HYDROCORTISONE 25 MG/G
CREAM TOPICAL 2 TIMES DAILY
COMMUNITY
Start: 2025-01-16

## 2025-02-11 NOTE — ASSESSMENT & PLAN NOTE
2/11/2025   EPWORTH SLEEPINESS SCALE   Sitting and reading 1   Watching TV 1   Sitting, inactive in a public place (e.g. a theatre or a meeting) 0   As a passenger in a car for an hour without a break 0   Lying down to rest in the afternoon when circumstances permit 3   Sitting and talking to someone 1   Sitting quietly after a lunch without alcohol 3   In a car, while stopped for a few minutes in traffic 0   Total score 9         Bed time 9-10 pm  Wake time: 3 am  FFM  [facility]   Data 11/13/2024 to 02/1010/2025  Usage > 4 hrs was 71%  APAP 6-16  AHI 6.6    Leak High

## 2025-02-11 NOTE — ASSESSMENT & PLAN NOTE
No recent exacebation  CXR reveiwed      Requested Prescriptions     Signed Prescriptions Disp Refills    albuterol (PROVENTIL) 2.5 mg /3 mL (0.083 %) nebulizer solution 360 mL 11     Sig: Take 3 mLs (2.5 mg total) by nebulization every 6 (six) hours as needed for Wheezing. Rescue    fluticasone-umeclidin-vilanter (TRELEGY ELLIPTA) 100-62.5-25 mcg DsDv 60 each 5     Sig: Inhale 1 puff into the lungs once daily.    albuterol (PROVENTIL/VENTOLIN HFA) 90 mcg/actuation inhaler 18 g 11     Sig: Inhale 2 puffs into the lungs every 4 (four) hours as needed for Wheezing.    melatonin (MELATIN) 3 mg tablet 90 tablet 3     Sig: Take 1 tablet (3 mg total) by mouth every evening.

## 2025-02-11 NOTE — PROGRESS NOTES
Subjective:       Patient ID: Leanna Maddox is a 79 y.o. female.  Patient Active Problem List   Diagnosis    Essential hypertension    Hyperlipidemia    SONJA on CPAP    BMI 45.0-49.9, adult    Chronic wheezy bronchitis    Edema of leg    Restrictive ventilatory defect    Controlled REM sleep behavior disorder    Anxiety    Arthritis    Bilateral carotid artery stenosis    Idiopathic chronic gout of right foot without tophus    Screening for respiratory organ cancer    Tortuous aorta     Immunization History   Administered Date(s) Administered    COVID-19, MRNA, LN-S, PF (Pfizer) (Gray Cap) 2022    COVID-19, MRNA, LN-S, PF (Pfizer) (Purple Cap) 2021, 2021    Influenza - Quadrivalent - PF *Preferred* (6 months and older) 2020    Influenza - Trivalent - Fluad - Adjuvanted - PF (65 years and older 2025    Influenza - Trivalent - Fluarix, Flulaval, Fluzone, Afluria - PF 10/15/2013    Influenza - Trivalent - Fluzone High Dose - PF (65 years and older) 2012, 2014, 2015, 2018    Pneumococcal Conjugate - 13 Valent 2018    Pneumococcal Polysaccharide - 23 Valent 2011, 2020    Tdap 2018     Social History     Tobacco Use   Smoking Status Former    Current packs/day: 0.00    Average packs/day: 0.5 packs/day for 58.0 years (29.0 ttl pk-yrs)    Types: Cigarettes    Start date: 1960    Quit date: 2018    Years since quittin.7   Smokeless Tobacco Never            Chief Complaint:  Leanna MADDOX 79 y.o.  Last visit 2021  Here with Daughter:  Functional dyspnea Charleen score was 4  No criteria for oxygen at night or with exercise  She is also treated for other comorbidities including obstructive sleep apnea on CPAP, REM behavioral disorder, gout,  Restrictive defect and Chronic wheezy bronchitis  Here to review: CXR   POC 2.0 LPM.  Quit smoking 3 years.  Added TRELEGY  No interval exacerbations  Discussed lower extremity edema adherence  to stockings      01/10/2024  Followup  Stable on TRELEGY  No cough, No wheezing, No SOB  No recent exacebations  ACT score  [place]  Leg edema resolved  CPAP supplies from O2 solution  Has oxygen Not using  No nightmares  Bed time 9 pm to 1 am  Wake time 7 am    02/11/2025   Follow-up visit   Accompanied by daughter   Doing well stable on Trelegy   No cough no wheezing no shortness a breath   No leg edema   Using CPAP needs supplies   Lots of leak noted  Ask for new nebulizer machine   No nightmares   She has a oxygen tank that is faulty and portable oxygen concentrator that she uses p.r.n. even though she does not meet criteria   She will get a flu shot today   Chest x-ray requested                 2/11/2025   EPWORTH SLEEPINESS SCALE   Sitting and reading 1   Watching TV 1   Sitting, inactive in a public place (e.g. a theatre or a meeting) 0   As a passenger in a car for an hour without a break 0   Lying down to rest in the afternoon when circumstances permit 3   Sitting and talking to someone 1   Sitting quietly after a lunch without alcohol 3   In a car, while stopped for a few minutes in traffic 0   Total score 9        The following portions of the patient's history were reviewed and updated as appropriate:   She  has a past medical history of CHF (congestive heart failure), Gout, and Hypertension.  She does not have any pertinent problems on file.  She  has no past surgical history on file.  Her family history is not on file.  She  reports that she quit smoking about 6 years ago. Her smoking use included cigarettes. She started smoking about 64 years ago. She has a 29 pack-year smoking history. She has never used smokeless tobacco. She reports that she does not drink alcohol. No history on file for drug use.  She has a current medication list which includes the following prescription(s): allopurinol, amlodipine, aspirin, atorvastatin, benazepril-hydrochlorthiazide, cyanocobalamin, doxycycline, furosemide,  "hydrocortisone, triamcinolone acetonide 0.1%, albuterol, albuterol, trelegy ellipta, and melatonin.  Current Outpatient Medications on File Prior to Visit   Medication Sig Dispense Refill    allopurinol (ZYLOPRIM) 100 MG tablet Take 100 mg by mouth.      amLODIPine (NORVASC) 2.5 MG tablet Take 2.5 mg by mouth once daily.      aspirin (ECOTRIN) 81 MG EC tablet Take 81 mg by mouth once daily.      atorvastatin (LIPITOR) 40 MG tablet Take 40 mg by mouth.      benazepril-hydrochlorthiazide (LOTENSIN HCT) 20-25 mg Tab       cyanocobalamin (VITAMIN B-12) 1000 MCG tablet Take 1,000 mcg by mouth.      doxycycline (VIBRA-TABS) 100 MG tablet Take 100 mg by mouth 2 (two) times daily as needed.      furosemide (LASIX) 20 MG tablet       hydrocortisone 2.5 % cream Apply topically 2 (two) times daily.      triamcinolone acetonide 0.1% (KENALOG) 0.1 % cream APPLY TO AFFECTED AREA BID PRN       No current facility-administered medications on file prior to visit.     She is allergic to codeine..      Review of Systems   Constitutional:  Negative for malaise/fatigue.   HENT: Negative.     Eyes: Negative.    Respiratory:  Negative for cough, hemoptysis, sputum production, shortness of breath and wheezing.    Cardiovascular:  Positive for leg swelling.   Gastrointestinal: Negative.    Genitourinary: Negative.    Musculoskeletal: Negative.    Skin: Negative.    Neurological: Negative.    Endo/Heme/Allergies: Negative.    Psychiatric/Behavioral: Negative.          Objective:        Vitals:    02/11/25 1426   BP: (!) 144/70   BP Location: Left arm   Patient Position: Sitting   Pulse: 88   Resp: 18   SpO2: 99%   Weight: 119.7 kg (263 lb 14.3 oz)   Height: 5' 2.5" (1.588 m)             Physical Exam  Vitals and nursing note reviewed.   Constitutional:       Appearance: She is well-developed.   HENT:      Head: Normocephalic and atraumatic.      Nose: Nose normal.      Mouth/Throat:      Pharynx: No oropharyngeal exudate.   Eyes:      " Conjunctiva/sclera: Conjunctivae normal.      Pupils: Pupils are equal, round, and reactive to light.   Cardiovascular:      Rate and Rhythm: Normal rate and regular rhythm.      Heart sounds: Murmur heard.   Pulmonary:      Effort: Pulmonary effort is normal. No respiratory distress.      Breath sounds: No stridor. No wheezing, rhonchi or rales.   Chest:      Chest wall: No tenderness.   Abdominal:      General: Bowel sounds are normal.      Palpations: Abdomen is soft.   Musculoskeletal:         General: Normal range of motion.      Cervical back: Normal range of motion and neck supple.      Right lower leg: No edema.      Left lower leg: No edema.   Skin:     General: Skin is warm.      Capillary Refill: Capillary refill takes 2 to 3 seconds.   Neurological:      General: No focal deficit present.      Mental Status: She is alert and oriented to person, place, and time.           Data Review: CBC:   Lab Results   Component Value Date    WBC 9.6 08/03/2023    HGB 13.6 08/03/2023    HCT 42.8 08/03/2023     08/03/2023     BMP:   Lab Results   Component Value Date    CREATININE 0.9 07/06/2019     Radiology review:    X-Ray Chest PA And Lateral  Narrative: EXAM: XR CHEST PA AND LATERAL    CLINICAL HISTORY: [J42]-Unspecified chronic bronchitis./[R94.2]-Abnormal results of pulmonary function studies.    COMPARISON:  Chest radiograph 12/02/2022, 01/10/2024    FINDINGS:  No confluent airspace opacity or consolidation.  There is no pleural effusion or pneumothorax.  Cardiomediastinal silhouette is within normal limits.  There is aortic calcification.  There is degenerative change of the spine.  No acute osseous abnormality.  Surgical clips overlie the right upper quadrant.  Impression:  Stable chest radiograph without acute abnormality.    Report Date: 2/11/2025 3:29 PM    Finalized on: 2/11/2025 3:29 PM By:  Jana Quigley MD  [facility]# [id number]      2025-02-11 16:17:[id number]     [facility]       Clinical Guide to  Interpretation or FeNO Levels :     FeNO  (ppb) LOW INTERMEDIATE HIGH   ADULT VALUES < 25 25-50          > 50   Th2-driven Inflammation Unlikely Likely Significant      Patients FeNO level at this visit : _19___ (ppb)     Interpretation of FeNO measurement in adults:  [x] FENO is less than 25 ppb implies non eosinophilic airway inflammation or the absence of airway inflammation.               Comment: Low FENO (<25 ppb) in adult asthmatics with persistent symptoms suggests other etiologies for these symptoms and a lower likelihood of benefit from adding or increasing inhaled glucocorticoids.    Assessment:      Problem List Items Addressed This Visit       BMI 45.0-49.9, adult    Essential hypertension    Hyperlipidemia    SONJA on CPAP         2/11/2025   EPWORTH SLEEPINESS SCALE   Sitting and reading 1   Watching TV 1   Sitting, inactive in a public place (e.g. a theatre or a meeting) 0   As a passenger in a car for an hour without a break 0   Lying down to rest in the afternoon when circumstances permit 3   Sitting and talking to someone 1   Sitting quietly after a lunch without alcohol 3   In a car, while stopped for a few minutes in traffic 0   Total score 9         Bed time 9-10 pm  Wake time: 3 am  FFM  DME [facility]   Data 11/13/2024 to 02/1010/2025  Usage > 4 hrs was 71%  APAP 6-16  AHI 6.6    Leak High         Relevant Orders    CPAP/BIPAP SUPPLIES    PULSE OXIMETRY OVERNIGHT    Chronic wheezy bronchitis - Primary     Cont TRELEGY  TERESA    Will need flu vacination         Relevant Medications    albuterol (PROVENTIL) 2.5 mg /3 mL (0.083 %) nebulizer solution    fluticasone-umeclidin-vilanter (TRELEGY ELLIPTA) 100-62.5-25 mcg DsDv    albuterol (PROVENTIL/VENTOLIN HFA) 90 mcg/actuation inhaler    Other Relevant Orders    Stress test, pulmonary    PULSE OXIMETRY OVERNIGHT    X-Ray Chest PA And Lateral (Completed)    Spirometry with/without bronchodilator    Restrictive ventilatory defect     No recent  exacebation  CXR reveiwed      Requested Prescriptions     Signed Prescriptions Disp Refills    albuterol (PROVENTIL) 2.5 mg /3 mL (0.083 %) nebulizer solution 360 mL 11     Sig: Take 3 mLs (2.5 mg total) by nebulization every 6 (six) hours as needed for Wheezing. Rescue    fluticasone-umeclidin-vilanter (TRELEGY ELLIPTA) 100-62.5-25 mcg DsDv 60 each 5     Sig: Inhale 1 puff into the lungs once daily.    albuterol (PROVENTIL/VENTOLIN HFA) 90 mcg/actuation inhaler 18 g 11     Sig: Inhale 2 puffs into the lungs every 4 (four) hours as needed for Wheezing.    melatonin (MELATIN) 3 mg tablet 90 tablet 3     Sig: Take 1 tablet (3 mg total) by mouth every evening.             Relevant Medications    albuterol (PROVENTIL) 2.5 mg /3 mL (0.083 %) nebulizer solution    fluticasone-umeclidin-vilanter (TRELEGY ELLIPTA) 100-62.5-25 mcg DsDv    albuterol (PROVENTIL/VENTOLIN HFA) 90 mcg/actuation inhaler    Other Relevant Orders    NEBULIZER FOR HOME USE    NEBULIZER KIT (SUPPLIES) FOR HOME USE    Stress test, pulmonary    PULSE OXIMETRY OVERNIGHT    X-Ray Chest PA And Lateral (Completed)    Spirometry with/without bronchodilator    Controlled REM sleep behavior disorder     Refill melatonin         Relevant Medications    melatonin (MELATIN) 3 mg tablet     Other Visit Diagnoses       Need for vaccination        Relevant Orders    Influenza - Trivalent (Adjuvanted) (Completed)           Plan:          Continue TRELEGY  Adherence with CPAP  Medication refill    Requested Prescriptions     Signed Prescriptions Disp Refills    albuterol (PROVENTIL) 2.5 mg /3 mL (0.083 %) nebulizer solution 360 mL 11     Sig: Take 3 mLs (2.5 mg total) by nebulization every 6 (six) hours as needed for Wheezing. Rescue    fluticasone-umeclidin-vilanter (TRELEGY ELLIPTA) 100-62.5-25 mcg DsDv 60 each 5     Sig: Inhale 1 puff into the lungs once daily.    albuterol (PROVENTIL/VENTOLIN HFA) 90 mcg/actuation inhaler 18 g 11     Sig: Inhale 2 puffs into the  "lungs every 4 (four) hours as needed for Wheezing.    melatonin (MELATIN) 3 mg tablet 90 tablet 3     Sig: Take 1 tablet (3 mg total) by mouth every evening.     Orders Placed This Encounter   Procedures    NEBULIZER FOR HOME USE     Order Specific Question:   Height:     Answer:   5' 2.5" (1.588 m)     Order Specific Question:   Weight:     Answer:   119.7 kg (263 lb 14.3 oz)     Order Specific Question:   Length of need (1-99 months):     Answer:   99    NEBULIZER KIT (SUPPLIES) FOR HOME USE     Order Specific Question:   Height:     Answer:   5' 2.5" (1.588 m)     Order Specific Question:   Weight:     Answer:   119.7 kg (263 lb 14.3 oz)     Order Specific Question:   Length of need (1-99 months):     Answer:   99     Order Specific Question:   Mask or Mouthpiece?     Answer:   Mouthpiece    CPAP/BIPAP SUPPLIES     Order Specific Question:   Length of need (1-99 months):     Answer:   99     Order Specific Question:   Choose ONE mask type and its corresponding cushions and/or pillows:     Answer:    Full Face Mask, 1 per 90 days:  Full Face Cushion, (3 per 90 days)     Order Specific Question:   Choose EITHER Heated or Non-Heated Tubjing     Answer:    Non-Heated Tubing, 1 per 90 days     Order Specific Question:   Number of Days Needed:     Answer:   365     Order Specific Question:   All other supplies as needed as listed below:     Answer:    Headgear, 1 per 180 days     Order Specific Question:   All other supplies as needed as listed below:     Answer:    Non-Disposable Filter, 1 per 180 days     Order Specific Question:   All other supplies as needed as listed below:     Answer:    Disposable Filter, 6 per 90 days     Order Specific Question:   All other supplies as needed as listed below:     Answer:    Exhalation Port, contact payer for quantity/frequency     Order Specific Question:   All other supplies as needed as listed below:     Answer:    Humidifier Chamber, " 1 per 180 days     Order Specific Question:   DME Agency:     Answer:   [facility]    X-Ray Chest PA And Lateral     Standing Status:   Future     Number of Occurrences:   1     Standing Expiration Date:   2/11/2026    Influenza - Trivalent (Adjuvanted)    Stress test, pulmonary     Standing Status:   Future     Standing Expiration Date:   2/11/2026     Order Specific Question:   Reason for study     Answer:   Functional status     Order Specific Question:   Release to patient     Answer:   Immediate    Spirometry with/without bronchodilator     Standing Status:   Future     Standing Expiration Date:   2/11/2026     Order Specific Question:   Release to patient     Answer:   Immediate    PULSE OXIMETRY OVERNIGHT     Standing Status:   Future     Number of Occurrences:   1     Standing Expiration Date:   2/11/2026     Order Specific Question:   Reason for Test?     Answer:   BiPAP/CPAP Assessment     Comments:   CPAP on RA     Order Specific Question:   Symptoms:     Answer:   Obesity           Follow up in about 3 months (around 5/11/2025), or cxr today, refill meds, 6mwd, Onsat, nebulizer orders, cpap supplies, download, for flu vacinne.    This note was prepared using voice recognition system and is likely to have sound alike errors that may have been overlooked even after proof reading.  Please call me with any questions    Discussed diagnosis, its evaluation, treatment and usual course. All questions answered.    Thank you for the courtesy of participating in the care of this patient    Uday Inman MD      MEDICAL DECISION MAKING: Moderate  complexity.  Overall, the multiple problems listed are of moderate to high severity that may impact quality of life and activities of daily living. Side effects of medications, treatment plan as well as options and alternatives reviewed and discussed with patient. There was counseling of patient concerning these issues.    Total time spent in face  to face counseling and coordination of care -  35minutes over 50% of time was used in discussion of prognosis, risks, benefits of treatment, instructions and compliance with regimen . Discussion with other physicians or health care providers occurred.    Uday Inman MD    TIME SPENT WITH PATIENT: Time spent: 35 minutes in face to face discussion concerning diagnosis, prognosis, review of lab and test results, benefits of treatment as well as management of disease, counseling of patient and coordination of care between various health care providers . Greater than half the time spent was used for coordination of care and counseling of patient.

## 2025-02-25 ENCOUNTER — TELEPHONE (OUTPATIENT)
Dept: PULMONOLOGY | Facility: CLINIC | Age: 80
End: 2025-02-25
Payer: MEDICARE

## 2025-02-25 NOTE — TELEPHONE ENCOUNTER
Spoke with pt daughter. She's trying to place pt in nursing home and they were needing xray results faxed over. Faxed xray to [facility] at [phone].

## 2025-02-25 NOTE — TELEPHONE ENCOUNTER
----- Message from Abebe sent at 2/25/2025 11:56 AM CST -----  Regarding: advice  Contact: SHILPA MADDOX [[id number]]  Type: Needs Medical AdviceWho Called:  StephanieSymptoms (please be specific):  naHow long has patient had these symptoms:  naPharmacy name and phone #:  naBest Call Back Number: [phone] Information: f/u on msg from last week about CXR results. No one called. Please call to advise.

## 2025-05-13 ENCOUNTER — CLINICAL SUPPORT (OUTPATIENT)
Dept: PULMONOLOGY | Facility: CLINIC | Age: 80
End: 2025-05-13
Payer: MEDICARE

## 2025-05-13 ENCOUNTER — OFFICE VISIT (OUTPATIENT)
Dept: PULMONOLOGY | Facility: CLINIC | Age: 80
End: 2025-05-13
Payer: MEDICARE

## 2025-05-13 ENCOUNTER — HOSPITAL ENCOUNTER (OUTPATIENT)
Dept: RADIOLOGY | Facility: HOSPITAL | Age: 80
Discharge: HOME | End: 2025-05-13
Attending: INTERNAL MEDICINE
Payer: MEDICARE

## 2025-05-13 VITALS
BODY MASS INDEX: 48.4 KG/M2 | OXYGEN SATURATION: 99 % | SYSTOLIC BLOOD PRESSURE: 159 MMHG | HEIGHT: 62 IN | HEART RATE: 84 BPM | DIASTOLIC BLOOD PRESSURE: 72 MMHG | BODY MASS INDEX: 48.4 KG/M2 | WEIGHT: 263 LBS | WEIGHT: 263 LBS | RESPIRATION RATE: 20 BRPM | HEIGHT: 62 IN

## 2025-05-13 DIAGNOSIS — R94.2 RESTRICTIVE VENTILATORY DEFECT: ICD-10-CM

## 2025-05-13 DIAGNOSIS — J42 CHRONIC WHEEZY BRONCHITIS: ICD-10-CM

## 2025-05-13 DIAGNOSIS — G47.52 CONTROLLED REM SLEEP BEHAVIOR DISORDER: ICD-10-CM

## 2025-05-13 DIAGNOSIS — R94.2 RESTRICTIVE VENTILATORY DEFECT: Primary | ICD-10-CM

## 2025-05-13 DIAGNOSIS — E78.2 MIXED HYPERLIPIDEMIA: ICD-10-CM

## 2025-05-13 DIAGNOSIS — G47.33 OSA ON CPAP: ICD-10-CM

## 2025-05-13 DIAGNOSIS — I10 ESSENTIAL HYPERTENSION: ICD-10-CM

## 2025-05-13 PROCEDURE — 71046 X-RAY EXAM CHEST 2 VIEWS: CPT | Mod: TC

## 2025-05-13 PROCEDURE — 71046 X-RAY EXAM CHEST 2 VIEWS: CPT | Mod: 26,,, | Performed by: RADIOLOGY

## 2025-05-13 PROCEDURE — 99999 PR PBB SHADOW E&M-EST. PATIENT-LVL I: CPT | Mod: PBBFAC,,,

## 2025-05-13 PROCEDURE — 99214 OFFICE O/P EST MOD 30 MIN: CPT | Mod: 25,S$GLB,, | Performed by: INTERNAL MEDICINE

## 2025-05-13 PROCEDURE — 1159F MED LIST DOCD IN RCRD: CPT | Mod: CPTII,S$GLB,, | Performed by: INTERNAL MEDICINE

## 2025-05-13 PROCEDURE — 3288F FALL RISK ASSESSMENT DOCD: CPT | Mod: CPTII,S$GLB,, | Performed by: INTERNAL MEDICINE

## 2025-05-13 PROCEDURE — 99999 PR PBB SHADOW E&M-EST. PATIENT-LVL V: CPT | Mod: PBBFAC,,, | Performed by: INTERNAL MEDICINE

## 2025-05-13 PROCEDURE — 3078F DIAST BP <80 MM HG: CPT | Mod: CPTII,S$GLB,, | Performed by: INTERNAL MEDICINE

## 2025-05-13 PROCEDURE — 3077F SYST BP >= 140 MM HG: CPT | Mod: CPTII,S$GLB,, | Performed by: INTERNAL MEDICINE

## 2025-05-13 PROCEDURE — 1101F PT FALLS ASSESS-DOCD LE1/YR: CPT | Mod: CPTII,S$GLB,, | Performed by: INTERNAL MEDICINE

## 2025-05-13 RX ORDER — IBUPROFEN 600 MG/1
TABLET, FILM COATED ORAL
COMMUNITY
Start: 2025-02-27

## 2025-05-13 RX ORDER — ALBUTEROL SULFATE 90 UG/1
2 INHALANT RESPIRATORY (INHALATION) EVERY 4 HOURS PRN
Qty: 18 G | Refills: 11 | Status: SHIPPED | OUTPATIENT
Start: 2025-05-13 | End: 2026-05-13

## 2025-05-13 RX ORDER — ALBUTEROL SULFATE 0.83 MG/ML
2.5 SOLUTION RESPIRATORY (INHALATION) EVERY 6 HOURS PRN
Qty: 360 ML | Refills: 11 | Status: SHIPPED | OUTPATIENT
Start: 2025-05-13 | End: 2026-05-13

## 2025-05-13 RX ORDER — FLUTICASONE FUROATE, UMECLIDINIUM BROMIDE AND VILANTEROL TRIFENATATE 100; 62.5; 25 UG/1; UG/1; UG/1
1 POWDER RESPIRATORY (INHALATION) DAILY
Qty: 60 EACH | Refills: 5 | Status: SHIPPED | OUTPATIENT
Start: 2025-05-13 | End: 2026-05-13

## 2025-05-13 NOTE — PROCEDURES
"[facility] - Pulmonary Function  Six Minute Walk     SUMMARY     Ordering Provider: Storm GREEN   Interpreting Provider: Storm GREEN  Performing nurse/tech/RT: LS RRT  Diagnosis:  (Bronchitis)  Height: 5' 2" (157.5 cm)  Weight: 119.3 kg (263 lb)  BMI (Calculated): 48.1                Phase Oxygen Assessment Supplemental O2 Heart   Rate Blood Pressure Charleen Dyspnea Scale Rating   Resting 98 % Room Air 78 bpm 165/74 0   Exercise        Minute        1 93 % Room Air 78 bpm     2 95 % Room Air 87 bpm     3 94 % Room Air 91 bpm     4 95 % Room Air 98 bpm     5 94 % Room Air 96 bpm     6  99 % Room Air 104 bpm 163/71 2   Recovery        Minute        1 98 % Room Air 95 bpm     2 99 % Room Air 89 bpm     3 100 % Room Air 90 bpm     4 99 % Room Air 84 bpm 159/72 0     Six Minute Walk Summary  6MWT Status: completed without stopping  Patient Reported:  (Back pain)     Interpretation:  Did the patient stop or pause?: No         Total Time Walked (Calculated): 360 seconds  Final Partial Lap Distance (feet): 100 feet  Total Distance Meters (Calculated): 152.4 meters  Predicted Distance Meters (Calculated): 275.3 meters  Percentage of Predicted (Calculated): 55.36  Peak VO2 (Calculated): 8.55  Mets: 2.44  Has The Patient Had a Previous Six Minute Walk Test?: Yes       Previous 6MWT Results  Has The Patient Had a Previous Six Minute Walk Test?: Yes  Date of Previous Test: 11/02/22  Total Time Walked: 360 seconds  Total Distance (meters): 259  Predicted Distance (meters): 277 meters  Percentage of Predicted: 93  Percent Change (Calculated): 0.41           CLINICAL INTERPRETATION:  Six minute walk distance is 152.4m (55.36 % predicted) with very light dyspnea.  During exercise, there was no significant desaturation while breathing room air.  Blood pressure remained stable and Heart rate remained stable with walking.  Hypertension was present prior to exercise.  The patient reported non-pulmonary symptoms during exercise.  The patient " did complete the study, walking 360 seconds of the 360 second test.  Since the previous study in 11/02/2022, exercise capacity is significantly worse.  Based upon age and body mass index, exercise capacity is less than predicted.      [x] Mild exercise-induced hypoxemia described as an arterial oxygen saturation of 93-95% (or 3-4% less than at rest)  []  Moderate exercise-induced hypoxemia as 89-93%  []  Severe exercise induced hypoxemia as < 89% O2 saturation.  Medicare Criteria for oxygen prescription comments:   []  When arterial oxygen saturation is at or below 88% during exercise (severe exercise induced hypoxemia) then the patient falls under Medicare Group 1 criteria for supplemental oxygen

## 2025-05-13 NOTE — PROGRESS NOTES
Subjective:       Patient ID: Leanna Maddox is a 79 y.o. female.  Patient Active Problem List   Diagnosis    Essential hypertension    Hyperlipidemia    SONJA on CPAP    BMI 45.0-49.9, adult    Chronic wheezy bronchitis    Edema of leg    Restrictive ventilatory defect    Controlled REM sleep behavior disorder    Anxiety    Arthritis    Bilateral carotid artery stenosis    Idiopathic chronic gout of right foot without tophus    Screening for respiratory organ cancer    Tortuous aorta     Immunization History   Administered Date(s) Administered    COVID-19, MRNA, LN-S, PF (Pfizer) (Gray Cap) 2022    COVID-19, MRNA, LN-S, PF (Pfizer) (Purple Cap) 2021, 2021    Influenza - Quadrivalent - PF *Preferred* (6 months and older) 2020    Influenza - Trivalent - Fluad - Adjuvanted - PF (65 years and older 2025    Influenza - Trivalent - Fluarix, Flulaval, Fluzone, Afluria - PF 10/15/2013    Influenza - Trivalent - Fluzone High Dose - PF (65 years and older) 2012, 2014, 2015, 2018    Pneumococcal Conjugate - 13 Valent 2018    Pneumococcal Polysaccharide - 23 Valent 2011, 2020    Tdap 2018     Social History     Tobacco Use   Smoking Status Former    Current packs/day: 0.00    Average packs/day: 0.5 packs/day for 58.0 years (29.0 ttl pk-yrs)    Types: Cigarettes    Start date: 1960    Quit date: 2018    Years since quittin.9   Smokeless Tobacco Never            Chief Complaint:  Leanna MADDOX 79 y.o.  Last visit 2021  Here with Daughter:  Functional dyspnea Charleen score was 4  No criteria for oxygen at night or with exercise  She is also treated for other comorbidities including obstructive sleep apnea on CPAP, REM behavioral disorder, gout,  Restrictive defect and Chronic wheezy bronchitis  Here to review: CXR   POC 2.0 LPM.  Quit smoking 3 years.  Added TRELEGY  No interval exacerbations  Discussed lower extremity edema adherence  to stockings      01/10/2024  Followup  Stable on TRELEGY  No cough, No wheezing, No SOB  No recent exacebations  ACT score  [place]  Leg edema resolved  CPAP supplies from O2 solution  Has oxygen Not using  No nightmares  Bed time 9 pm to 1 am  Wake time 7 am    02/11/2025   Follow-up visit   Accompanied by daughter   Doing well stable on Trelegy   No cough no wheezing no shortness a breath   No leg edema   Using CPAP needs supplies   Lots of leak noted  Ask for new nebulizer machine   No nightmares   She has a oxygen tank that is faulty and portable oxygen concentrator that she uses p.r.n. even though she does not meet criteria   She will get a flu shot today   Chest x-ray requested       05/13/2025   Follow-up visit   Accompanied by daughter   Currently living at the [facility]   No cough no wheezing no shortness a breath   Tolerates all her therapy well   CPAP reviewed   PFTs reviewed   X-ray reviewed   Medication list from nursing home was also reviewed   Reduced exercise capacity due to back pain  Reduced distance walked   No oxygen desaturation on walk                5/13/2025   EPWORTH SLEEPINESS SCALE   Sitting and reading 1   Watching TV 3   Sitting, inactive in a public place (e.g. a theatre or a meeting) 0   As a passenger in a car for an hour without a break 0   Lying down to rest in the afternoon when circumstances permit 2   Sitting and talking to someone 0   Sitting quietly after a lunch without alcohol 3   In a car, while stopped for a few minutes in traffic 0   Total score 9        The following portions of the patient's history were reviewed and updated as appropriate:   She  has a past medical history of CHF (congestive heart failure), Gout, and Hypertension.  She does not have any pertinent problems on file.  She  has no past surgical history on file.  Her family history is not on file.  She  reports that she quit smoking about 6 years ago. Her smoking use included cigarettes. She  started smoking about 64 years ago. She has a 29 pack-year smoking history. She has never used smokeless tobacco. She reports that she does not drink alcohol. No history on file for drug use.  She has a current medication list which includes the following prescription(s): allopurinol, amlodipine, aspirin, atorvastatin, benazepril-hydrochlorthiazide, cyanocobalamin, doxycycline, furosemide, hydrocortisone, ibuprofen, l. acidophilus/l.bulgaricus, melatonin, triamcinolone acetonide 0.1%, albuterol, albuterol, and trelegy ellipta.  Current Outpatient Medications on File Prior to Visit   Medication Sig Dispense Refill    allopurinol (ZYLOPRIM) 100 MG tablet Take 100 mg by mouth.      amLODIPine (NORVASC) 2.5 MG tablet Take 2.5 mg by mouth once daily.      aspirin (ECOTRIN) 81 MG EC tablet Take 81 mg by mouth once daily.      atorvastatin (LIPITOR) 40 MG tablet Take 40 mg by mouth.      benazepril-hydrochlorthiazide (LOTENSIN HCT) 20-25 mg Tab       cyanocobalamin (VITAMIN B-12) 1000 MCG tablet Take 1,000 mcg by mouth.      doxycycline (VIBRA-TABS) 100 MG tablet Take 100 mg by mouth 2 (two) times daily as needed.      furosemide (LASIX) 20 MG tablet       hydrocortisone 2.5 % cream Apply topically 2 (two) times daily.      ibuprofen (ADVIL,MOTRIN) 600 MG tablet       L. acidophilus/L.bulgaricus (LACTOBACILLUS ACIDOPH-L.BULGAR ORAL) Take by mouth.      melatonin (MELATIN) 3 mg tablet Take 1 tablet (3 mg total) by mouth every evening. 90 tablet 3    triamcinolone acetonide 0.1% (KENALOG) 0.1 % cream APPLY TO AFFECTED AREA BID PRN       No current facility-administered medications on file prior to visit.     She is allergic to codeine..      Review of Systems   Constitutional:  Negative for malaise/fatigue.   HENT: Negative.     Eyes: Negative.    Respiratory:  Negative for cough, hemoptysis, sputum production, shortness of breath and wheezing.    Cardiovascular:  Positive for leg swelling.   Gastrointestinal: Negative.   "  Genitourinary: Negative.    Musculoskeletal: Negative.    Skin: Negative.    Neurological: Negative.    Endo/Heme/Allergies: Negative.    Psychiatric/Behavioral: Negative.          Objective:        Vitals:    05/13/25 1440   BP: (!) 159/72   Pulse: 84   Resp: 20   SpO2: 99%   Weight: 119.3 kg (263 lb 0.1 oz)   Height: 5' 2" (1.575 m)               Physical Exam  Vitals and nursing note reviewed.   Constitutional:       Appearance: She is well-developed.   HENT:      Head: Normocephalic and atraumatic.      Nose: Nose normal.      Mouth/Throat:      Pharynx: No oropharyngeal exudate.   Eyes:      Conjunctiva/sclera: Conjunctivae normal.      Pupils: Pupils are equal, round, and reactive to light.   Cardiovascular:      Rate and Rhythm: Normal rate and regular rhythm.      Heart sounds: Murmur heard.   Pulmonary:      Effort: Pulmonary effort is normal. No respiratory distress.      Breath sounds: No stridor. No wheezing, rhonchi or rales.   Chest:      Chest wall: No tenderness.   Abdominal:      General: Bowel sounds are normal.      Palpations: Abdomen is soft.   Musculoskeletal:         General: Normal range of motion.      Cervical back: Normal range of motion and neck supple.      Right lower leg: No edema.      Left lower leg: No edema.   Skin:     General: Skin is warm.      Capillary Refill: Capillary refill takes 2 to 3 seconds.   Neurological:      General: No focal deficit present.      Mental Status: She is alert and oriented to person, place, and time.           Data Review: CBC:   Lab Results   Component Value Date    WBC 9.6 08/03/2023    HGB 13.6 08/03/2023    HCT 42.8 08/03/2023     08/03/2023     BMP:   Lab Results   Component Value Date    CREATININE 0.9 07/06/2019     Radiology review:    X-Ray Chest PA And Lateral  Narrative: EXAM: XR CHEST PA AND LATERAL    CLINICAL HISTORY: Unspecified chronic bronchitis    FINDINGS:  Comparison made to prior chest x-ray dated 02/11/2025.  Normal " "heart size.  Clear lungs.  No pleural effusion or pneumothorax or pulmonary edema.  Intact thoracic osseous structures.  Impression:  No acute cardiopulmonary process.    Finalized on: 5/13/2025 10:50 PM By:  Sebastian Heredia MD  [facility]# [id number]      2025-05-13 22:52:[id number]     [facility]       Ordering Provider: Storm GREEN         Interpreting Provider: Storm GREEN  Performing nurse/tech/RT: LS RRT  Diagnosis:  (Bronchitis)  Height: 5' 2" (157.5 cm)  Weight: 119.3 kg (263 lb)  BMI (Calculated): 48.1                                                                                 Phase Oxygen Assessment Supplemental O2 Heart   Rate Blood Pressure Charleen Dyspnea Scale Rating   Resting 98 % Room Air 78 bpm 165/74 0   Exercise             Minute             1 93 % Room Air 78 bpm       2 95 % Room Air 87 bpm       3 94 % Room Air 91 bpm       4 95 % Room Air 98 bpm       5 94 % Room Air 96 bpm       6  99 % Room Air 104 bpm 163/71 2   Recovery             Minute             1 98 % Room Air 95 bpm       2 99 % Room Air 89 bpm       3 100 % Room Air 90 bpm       4 99 % Room Air 84 bpm 159/72 0      Six Minute Walk Summary  6MWT Status: completed without stopping  Patient Reported:  (Back pain)                Interpretation:  Did the patient stop or pause?: No  Total Time Walked (Calculated): 360 seconds  Final Partial Lap Distance (feet): 100 feet  Total Distance Meters (Calculated): 152.4 meters  Predicted Distance Meters (Calculated): 275.3 meters  Percentage of Predicted (Calculated): 55.36  Peak VO2 (Calculated): 8.55  Mets: 2.44  Has The Patient Had a Previous Six Minute Walk Test?: Yes     Previous 6MWT Results  Has The Patient Had a Previous Six Minute Walk Test?: Yes  Date of Previous Test: 11/02/22  Total Time Walked: 360 seconds  Total Distance (meters): 259  Predicted Distance (meters): 277 meters  Percentage of Predicted: 93  Percent Change (Calculated): 0.41    sPIROMETRY  Spirometry: FEV1: 1.42L( 77.6%), FVC 1.64L( " 68.4%)  FEV1/FVC 87    Leanna Jefferson  2025 - 2025  : 1945  Age: 79 years  [facility] [employer]  [employer]  [address], [address]  Phone: [phone]  Email: [email or web address]  Compliance Report  Compliance  Payor Standard  Usage 2025 - 2025  Usage days 30/30 days (100%)  >= 4 hours 28 days (93%)  < 4 hours 2 days (7%)  Usage hours 163 hours 48 minutes  Average usage (total days) 5 hours 28 minutes  Average usage (days used) 5 hours 28 minutes  Median usage (days used) 5 hours 29 minutes  Total used hours (value since last reset - 2025) 8,240 hours  [employer] 10 AutoSet  Serial number [id number]  Mode AutoSet  Min Pressure 6 cmH2O  Max Pressure 16 cmH2O  EPR Fulltime  EPR level 3  Response Standard  Therapy  Pressure - cmH2O Median: 9.3 95th percentile: 13.2 Maximum: 14.4  Leaks - L/min Median: 16.9 95th percentile: 47.7 Maximum: 117.0  Events per hour AI: 3.3 HI: 0.8 AHI: 4.1  Apnea Index Central: 0.0 Obstructive: 1.4 Unknown: 1.8  RERA Index 0.5  Cheyne-Alarcon respiration (average duration per night) 0 minutes (0%)    Assessment:      Problem List Items Addressed This Visit       Essential hypertension    Hyperlipidemia    Controlled REM sleep behavior disorder    SONJA on CPAP    Auto PAP 6-16 cm water pressure   Fullface mask   Bedtime 8:00 p.m. neck wake-up time 8:00 a.m.   Using an benefits   Usage greater than 4 hours was 93%   AHI 4.1            BMI 45.0-49.9, adult    Weight loss exercise         Chronic wheezy bronchitis    Continue Trelegy Ellipta   Continue albuterol   Has nebulizer for p.r.n. use   Supplementary oxygen p.r.n.            Relevant Medications    albuterol (PROVENTIL) 2.5 mg /3 mL (0.083 %) nebulizer solution    albuterol (PROVENTIL/VENTOLIN HFA) 90 mcg/actuation inhaler    fluticasone-umeclidin-vilanter (TRELEGY ELLIPTA) 100-62.5-25 mcg DsDv    Other Relevant Orders    X-Ray Chest PA And Lateral (Completed)     Spirometry without Bronchodilator    CPAP/BIPAP SUPPLIES    Restrictive ventilatory defect - Primary    X-ray reviewed clear   PFTs reviewed: Consistent with mild restrictive defect.         Relevant Medications    albuterol (PROVENTIL) 2.5 mg /3 mL (0.083 %) nebulizer solution    albuterol (PROVENTIL/VENTOLIN HFA) 90 mcg/actuation inhaler    fluticasone-umeclidin-vilanter (TRELEGY ELLIPTA) 100-62.5-25 mcg DsDv    Other Relevant Orders    X-Ray Chest PA And Lateral (Completed)    Spirometry without Bronchodilator    CPAP/BIPAP SUPPLIES    X-Ray Chest PA And Lateral    Stress test, pulmonary      Plan:          Continue TRELEGY  Adherence with CPAP  Medication refill    Requested Prescriptions     Signed Prescriptions Disp Refills    albuterol (PROVENTIL) 2.5 mg /3 mL (0.083 %) nebulizer solution 360 mL 11     Sig: Take 3 mLs (2.5 mg total) by nebulization every 6 (six) hours as needed for Wheezing. Rescue    albuterol (PROVENTIL/VENTOLIN HFA) 90 mcg/actuation inhaler 18 g 11     Sig: Inhale 2 puffs into the lungs every 4 (four) hours as needed for Wheezing.    fluticasone-umeclidin-vilanter (TRELEGY ELLIPTA) 100-62.5-25 mcg DsDv 60 each 5     Sig: Inhale 1 puff into the lungs once daily.     Orders Placed This Encounter   Procedures    CPAP/BIPAP SUPPLIES     Length of need (1-99 months)::   99     Choose ONE mask type and its corresponding cushions and/or pillows::    Full Face Mask, 1 per 90 days:  Full Face Cushion, (3 per 90 days)     Choose EITHER Heated or Non-Heated Tubing::    Non-Heated Tubing, 1 per 90 days     All other supplies as needed as listed below::    Headgear, 1 per 180 days     All other supplies as needed as listed below::    Non-Disposable Filter, 1 per 180 days     All other supplies as needed as listed below::    Disposable Filter, 6 per 90 days     All other supplies as needed as listed below::    Exhalation Port, contact payer for quantity/frequency      All other supplies as needed as listed below::    Humidifier Chamber, 1 per 180 days     DME Agency::   [facility] [employer] [facility]    X-Ray Chest PA And Lateral     Standing Status:   Future     Number of Occurrences:   1     Expected Date:   5/13/2025     Expiration Date:   5/13/2026    X-Ray Chest PA And Lateral     Standing Status:   Future     Expected Date:   5/13/2025     Expiration Date:   5/13/2026    Spirometry without Bronchodilator     Standing Status:   Future     Expiration Date:   5/13/2026     Release to patient:   Immediate    Stress test, pulmonary     Standing Status:   Future     Expiration Date:   5/13/2026     Reason for study:   Functional status     Release to patient:   Immediate           Follow up in about 1 year (around 5/13/2026), or cxr TODAY, REFILL SUPPLIES, MAKI, WALK, DOWNLOAD, CXR.    This note was prepared using voice recognition system and is likely to have sound alike errors that may have been overlooked even after proof reading.  Please call me with any questions    Discussed diagnosis, its evaluation, treatment and usual course. All questions answered.    Thank you for the courtesy of participating in the care of this patient    Uday Inman MD      MEDICAL DECISION MAKING: Moderate  complexity.  Overall, the multiple problems listed are of moderate to high severity that may impact quality of life and activities of daily living. Side effects of medications, treatment plan as well as options and alternatives reviewed and discussed with patient. There was counseling of patient concerning these issues.    Total time spent in face to face counseling and coordination of care -  35minutes over 50% of time was used in discussion of prognosis, risks, benefits of treatment, instructions and compliance with regimen . Discussion with other physicians or health care providers occurred.    Uday Inman MD    TIME SPENT WITH PATIENT: Time spent: 35 minutes in face to  face discussion concerning diagnosis, prognosis, review of lab and test results, benefits of treatment as well as management of disease, counseling of patient and coordination of care between various health care providers . Greater than half the time spent was used for coordination of care and counseling of patient.

## 2025-05-14 ENCOUNTER — RESULTS FOLLOW-UP (OUTPATIENT)
Dept: SLEEP MEDICINE | Facility: CLINIC | Age: 80
End: 2025-05-14

## 2025-05-14 LAB
BRPFT: ABNORMAL
FEF 25 75 CHG: 14.3 %
FEF 25 75 LLN: 0.84
FEF 25 75 POST REF: 91.6 %
FEF 25 75 PRE REF: 80.1 %
FEF 25 75 REF: 2.24
FET100 CHG: -4.8 %
FEV1 CHG: -1.3 %
FEV1 FVC CHG: -2.2 %
FEV1 FVC LLN: 63
FEV1 FVC POST REF: 109.7 %
FEV1 FVC PRE REF: 112.2 %
FEV1 FVC REF: 77
FEV1 LLN: 1.29
FEV1 POST REF: 76.5 %
FEV1 PRE REF: 77.6 %
FEV1 REF: 1.84
FVC CHG: 0.9 %
FVC LLN: 1.69
FVC POST REF: 69 %
FVC PRE REF: 68.4 %
FVC REF: 2.4
PEF CHG: -14 %
PEF LLN: 3.07
PEF POST REF: 110.7 %
PEF PRE REF: 128.7 %
PEF REF: 4.68
POST FEF 25 75: 2.05 L/S (ref 0.84–3.63)
POST FET 100: 5.79 SEC
POST FEV1 FVC: 84.88 % (ref 62.86–89.99)
POST FEV1: 1.4 L (ref 1.29–2.36)
POST FVC: 1.66 L (ref 1.69–3.15)
POST PEF: 5.18 L/S (ref 3.07–6.28)
PRE FEF 25 75: 1.79 L/S (ref 0.84–3.63)
PRE FET 100: 6.07 SEC
PRE FEV1 FVC: 86.81 % (ref 62.86–89.99)
PRE FEV1: 1.42 L (ref 1.29–2.36)
PRE FVC: 1.64 L (ref 1.69–3.15)
PRE PEF: 6.02 L/S (ref 3.07–6.28)

## 2025-05-14 NOTE — ASSESSMENT & PLAN NOTE
Auto PAP 6-16 cm water pressure   Fullface mask   Bedtime 8:00 p.m. neck wake-up time 8:00 a.m.   Using an benefits   Usage greater than 4 hours was 93%   AHI 4.1

## 2025-05-14 NOTE — ASSESSMENT & PLAN NOTE
Continue Trelegy Ellipta   Continue albuterol   Has nebulizer for p.r.n. use   Supplementary oxygen p.r.n.